# Patient Record
Sex: FEMALE | Race: WHITE | NOT HISPANIC OR LATINO | Employment: UNEMPLOYED | URBAN - METROPOLITAN AREA
[De-identification: names, ages, dates, MRNs, and addresses within clinical notes are randomized per-mention and may not be internally consistent; named-entity substitution may affect disease eponyms.]

---

## 2017-08-28 ENCOUNTER — PROCEDURE VISIT (OUTPATIENT)
Dept: OBSTETRICS AND GYNECOLOGY | Facility: CLINIC | Age: 22
End: 2017-08-28
Payer: MEDICAID

## 2017-08-28 ENCOUNTER — OFFICE VISIT (OUTPATIENT)
Dept: OBSTETRICS AND GYNECOLOGY | Facility: CLINIC | Age: 22
End: 2017-08-28
Payer: MEDICAID

## 2017-08-28 VITALS
WEIGHT: 224.88 LBS | DIASTOLIC BLOOD PRESSURE: 74 MMHG | HEIGHT: 60 IN | BODY MASS INDEX: 44.15 KG/M2 | SYSTOLIC BLOOD PRESSURE: 102 MMHG

## 2017-08-28 DIAGNOSIS — Z34.80 SUPERVISION OF OTHER NORMAL PREGNANCY, ANTEPARTUM: ICD-10-CM

## 2017-08-28 DIAGNOSIS — Z34.80 SUPERVISION OF OTHER NORMAL PREGNANCY, ANTEPARTUM: Primary | ICD-10-CM

## 2017-08-28 LAB
BACTERIA #/AREA URNS AUTO: ABNORMAL /HPF
BILIRUB UR QL STRIP: NEGATIVE
CLARITY UR REFRACT.AUTO: ABNORMAL
COLOR UR AUTO: YELLOW
GLUCOSE UR QL STRIP: NEGATIVE
HGB UR QL STRIP: NEGATIVE
KETONES UR QL STRIP: NEGATIVE
LEUKOCYTE ESTERASE UR QL STRIP: ABNORMAL
MICROSCOPIC COMMENT: ABNORMAL
NITRITE UR QL STRIP: NEGATIVE
NON-SQ EPI CELLS #/AREA URNS AUTO: <1 /HPF
PH UR STRIP: 5 [PH] (ref 5–8)
PROT UR QL STRIP: NEGATIVE
RBC #/AREA URNS AUTO: 1 /HPF (ref 0–4)
SP GR UR STRIP: 1.02 (ref 1–1.03)
SQUAMOUS #/AREA URNS AUTO: 32 /HPF
URN SPEC COLLECT METH UR: ABNORMAL
UROBILINOGEN UR STRIP-ACNC: NEGATIVE EU/DL
WBC #/AREA URNS AUTO: 18 /HPF (ref 0–5)

## 2017-08-28 PROCEDURE — 99213 OFFICE O/P EST LOW 20 MIN: CPT | Mod: TH,25,S$PBB, | Performed by: NURSE PRACTITIONER

## 2017-08-28 PROCEDURE — 3008F BODY MASS INDEX DOCD: CPT | Mod: ,,, | Performed by: NURSE PRACTITIONER

## 2017-08-28 PROCEDURE — 76805 OB US >/= 14 WKS SNGL FETUS: CPT | Mod: PBBFAC | Performed by: OBSTETRICS & GYNECOLOGY

## 2017-08-28 PROCEDURE — 76805 OB US >/= 14 WKS SNGL FETUS: CPT | Mod: 26,S$PBB,, | Performed by: OBSTETRICS & GYNECOLOGY

## 2017-08-28 PROCEDURE — 99999 PR PBB SHADOW E&M-EST. PATIENT-LVL III: CPT | Mod: PBBFAC,,, | Performed by: NURSE PRACTITIONER

## 2017-08-28 RX ORDER — LISDEXAMFETAMINE DIMESYLATE 70 MG/1
CAPSULE ORAL
COMMUNITY
Start: 2017-08-03 | End: 2017-08-28

## 2017-08-28 NOTE — PROGRESS NOTES
CC: Risk assessment    Sybil Bal is a 22 y.o. female  presents for risk assessment.Patient has a h/o Bipolar and is currently on Geodon. Patient has an OB h/o 3 term vaginal deliveries. Different FOB. Last pap exam was normal, .    Past Medical History:   Diagnosis Date    Anemia     Anxiety     Bipolar 1 disorder     Morbid obesity     pt weighed 400# prior to gastric sleeve surgery    Tobacco use disorder      Past Surgical History:   Procedure Laterality Date    ABDOMINAL SURGERY      gastric sleeve      TONSILLECTOMY       Social History     Social History    Marital status: Single     Spouse name: N/A    Number of children: N/A    Years of education: N/A     Occupational History    Not on file.     Social History Main Topics    Smoking status: Heavy Tobacco Smoker     Packs/day: 1.00     Types: Cigarettes     Last attempt to quit: 2/10/2014    Smokeless tobacco: Never Used    Alcohol use No    Drug use: No    Sexual activity: Yes     Partners: Male     Birth control/ protection: None     Other Topics Concern    Not on file     Social History Narrative    No narrative on file     Family History   Problem Relation Age of Onset    Diabetes Neg Hx     Hypertension Neg Hx      OB History      Para Term  AB Living    3 3 3     3    SAB TAB Ectopic Multiple Live Births          0 3          /74   Ht 5' (1.524 m)   Wt 102 kg (224 lb 13.9 oz)   BMI 43.92 kg/m²       ROS:  GENERAL: Denies weight gain or weight loss. Feeling well overall.   SKIN: Denies rash or lesions.   HEAD: Denies head injury or headache.   NODES: Denies enlarged lymph nodes.   CHEST: Denies chest pain or shortness of breath.   CARDIOVASCULAR: Denies palpitations or left sided chest pain.   ABDOMEN: No abdominal pain, constipation, diarrhea, nausea, vomiting or rectal bleeding.   URINARY: No frequency, dysuria, hematuria, or burning on urination.  REPRODUCTIVE: See HPI.   BREASTS: The  patient performs breast self-examination and denies pain, lumps, or nipple discharge.   HEMATOLOGIC: No easy bruisability or excessive bleeding.   MUSCULOSKELETAL: Denies joint pain or swelling.   NEUROLOGIC: Denies syncope or weakness.   PSYCHIATRIC: Denies depression, anxiety or mood swings.    PHYSICAL EXAM:  APPEARANCE: Obese female, in no acute distress.  AFFECT: WNL, alert and oriented x 3  SKIN: No acne or hirsutism  NECK: Neck symmetric without masses or thyromegaly  NODES: No inguinal, cervical, axillary, or femoral lymph node enlargement  CHEST: Good respiratory effect  ABDOMEN: Gravid 20 weeks  PELVIC: Normal external genitalia without lesions.  Normal hair distribution.  Adequate perineal body, normal urethral meatus.  Vagina moist and well rugated without lesions or discharge.  Cervix pink, without lesions, discharge or tenderness.        PLAN:  Ultrasound and labs today  Will remain on Geodon until next visit for additional evaluation from MD or CNM

## 2017-08-29 LAB
BACTERIA UR CULT: NORMAL
BACTERIA UR CULT: NORMAL
C TRACH DNA SPEC QL NAA+PROBE: NOT DETECTED
N GONORRHOEA DNA SPEC QL NAA+PROBE: NOT DETECTED

## 2017-08-30 ENCOUNTER — TELEPHONE (OUTPATIENT)
Dept: OBSTETRICS AND GYNECOLOGY | Facility: CLINIC | Age: 22
End: 2017-08-30

## 2017-08-30 NOTE — TELEPHONE ENCOUNTER
----- Message from Krystle Tellez sent at 8/30/2017  8:24 AM CDT -----  Contact: pt   States she's calling to be worked in later today for her appt. Rescheduling due to not having someone to watch her kids and can be reached at [phone]//[email or web address]

## 2017-09-07 ENCOUNTER — TELEPHONE (OUTPATIENT)
Dept: OBSTETRICS AND GYNECOLOGY | Facility: CLINIC | Age: 22
End: 2017-09-07

## 2017-09-07 NOTE — TELEPHONE ENCOUNTER
Spoke with the patient and she wanted to be scheduled in [place] or [facility] and can not make it to [facility] today. I have the patient rescheduled for Tuesday 9/12/17 @ 1 pm with Maryam.

## 2017-09-07 NOTE — TELEPHONE ENCOUNTER
----- Message from Nemesio Cuevas sent at 9/7/2017  1:49 PM CDT -----  Pt is requesting a call from nurse in regards to r/s her apt.        Please call pt back at [phone]

## 2017-09-12 ENCOUNTER — INITIAL PRENATAL (OUTPATIENT)
Dept: OBSTETRICS AND GYNECOLOGY | Facility: CLINIC | Age: 22
End: 2017-09-12
Payer: MEDICAID

## 2017-09-12 VITALS
SYSTOLIC BLOOD PRESSURE: 100 MMHG | WEIGHT: 224.63 LBS | DIASTOLIC BLOOD PRESSURE: 60 MMHG | BODY MASS INDEX: 43.87 KG/M2

## 2017-09-12 DIAGNOSIS — O99.212 OBESITY AFFECTING PREGNANCY IN SECOND TRIMESTER: ICD-10-CM

## 2017-09-12 PROCEDURE — 99212 OFFICE O/P EST SF 10 MIN: CPT | Mod: PBBFAC,PO | Performed by: ADVANCED PRACTICE MIDWIFE

## 2017-09-12 PROCEDURE — 3008F BODY MASS INDEX DOCD: CPT | Mod: ,,, | Performed by: ADVANCED PRACTICE MIDWIFE

## 2017-09-12 PROCEDURE — 99999 PR PBB SHADOW E&M-EST. PATIENT-LVL II: CPT | Mod: PBBFAC,,, | Performed by: ADVANCED PRACTICE MIDWIFE

## 2017-09-12 PROCEDURE — 99213 OFFICE O/P EST LOW 20 MIN: CPT | Mod: TH,S$PBB,, | Performed by: ADVANCED PRACTICE MIDWIFE

## 2017-09-12 NOTE — PROGRESS NOTES
Pt here for new ob visit  Reoriented to the practice including CNM/MD collaboration  Labs ordered  Introduced to Coffective loree  Blue bag materials reviewed  Warning signs discussed.

## 2017-09-13 ENCOUNTER — LAB VISIT (OUTPATIENT)
Dept: LAB | Facility: HOSPITAL | Age: 22
End: 2017-09-13
Attending: NURSE PRACTITIONER
Payer: MEDICAID

## 2017-09-13 DIAGNOSIS — Z34.80 SUPERVISION OF OTHER NORMAL PREGNANCY, ANTEPARTUM: ICD-10-CM

## 2017-09-13 LAB
ABO + RH BLD: NORMAL
BLD GP AB SCN CELLS X3 SERPL QL: NORMAL
GLUCOSE SERPL-MCNC: 101 MG/DL

## 2017-09-13 PROCEDURE — 86703 HIV-1/HIV-2 1 RESULT ANTBDY: CPT

## 2017-09-13 PROCEDURE — 86901 BLOOD TYPING SEROLOGIC RH(D): CPT

## 2017-09-13 PROCEDURE — 86762 RUBELLA ANTIBODY: CPT

## 2017-09-13 PROCEDURE — 82947 ASSAY GLUCOSE BLOOD QUANT: CPT

## 2017-09-13 PROCEDURE — 80074 ACUTE HEPATITIS PANEL: CPT

## 2017-09-13 PROCEDURE — 36415 COLL VENOUS BLD VENIPUNCTURE: CPT | Mod: PO

## 2017-09-13 PROCEDURE — 86900 BLOOD TYPING SEROLOGIC ABO: CPT

## 2017-09-13 PROCEDURE — 86592 SYPHILIS TEST NON-TREP QUAL: CPT

## 2017-09-14 LAB
HAV IGM SERPL QL IA: NEGATIVE
HBV CORE IGM SERPL QL IA: NEGATIVE
HBV SURFACE AG SERPL QL IA: NEGATIVE
HCV AB SERPL QL IA: NEGATIVE
HIV 1+2 AB+HIV1 P24 AG SERPL QL IA: NEGATIVE
RPR SER QL: NORMAL
RUBV IGG SER-ACNC: 35.6 IU/ML
RUBV IGG SER-IMP: REACTIVE

## 2017-09-25 ENCOUNTER — ROUTINE PRENATAL (OUTPATIENT)
Dept: OBSTETRICS AND GYNECOLOGY | Facility: CLINIC | Age: 22
End: 2017-09-25
Payer: MEDICAID

## 2017-09-25 ENCOUNTER — PROCEDURE VISIT (OUTPATIENT)
Dept: OBSTETRICS AND GYNECOLOGY | Facility: CLINIC | Age: 22
End: 2017-09-25
Payer: MEDICAID

## 2017-09-25 VITALS — SYSTOLIC BLOOD PRESSURE: 108 MMHG | WEIGHT: 221 LBS | BODY MASS INDEX: 43.16 KG/M2 | DIASTOLIC BLOOD PRESSURE: 76 MMHG

## 2017-09-25 DIAGNOSIS — Z34.92 ENCOUNTER FOR SUPERVISION OF NORMAL PREGNANCY IN SECOND TRIMESTER, UNSPECIFIED GRAVIDITY: Primary | ICD-10-CM

## 2017-09-25 PROCEDURE — 3008F BODY MASS INDEX DOCD: CPT | Mod: ,,, | Performed by: ADVANCED PRACTICE MIDWIFE

## 2017-09-25 PROCEDURE — 99212 OFFICE O/P EST SF 10 MIN: CPT | Mod: PBBFAC,PO | Performed by: ADVANCED PRACTICE MIDWIFE

## 2017-09-25 PROCEDURE — 99213 OFFICE O/P EST LOW 20 MIN: CPT | Mod: TH,S$PBB,, | Performed by: ADVANCED PRACTICE MIDWIFE

## 2017-09-25 PROCEDURE — 99999 PR PBB SHADOW E&M-EST. PATIENT-LVL II: CPT | Mod: PBBFAC,,, | Performed by: ADVANCED PRACTICE MIDWIFE

## 2017-09-25 PROCEDURE — 76805 OB US >/= 14 WKS SNGL FETUS: CPT | Mod: 26,S$PBB,, | Performed by: OBSTETRICS & GYNECOLOGY

## 2017-09-25 PROCEDURE — 76805 OB US >/= 14 WKS SNGL FETUS: CPT | Mod: PBBFAC,PO | Performed by: OBSTETRICS & GYNECOLOGY

## 2017-09-25 RX ORDER — LISDEXAMFETAMINE DIMESYLATE 70 MG/1
CAPSULE ORAL
COMMUNITY
Start: 2017-09-14 | End: 2017-11-21

## 2017-09-25 RX ORDER — HYDROXYZINE PAMOATE 25 MG/1
25 CAPSULE ORAL 4 TIMES DAILY
Qty: 30 CAPSULE | Refills: 2 | Status: SHIPPED | OUTPATIENT
Start: 2017-09-25 | End: 2018-01-20 | Stop reason: SDUPTHER

## 2017-09-25 NOTE — PROGRESS NOTES
Anatomy u/s complete  Complaining of a cough. Recommendations made  PTL talk  28 week labs next visit    Coffective counseling sheet Keep Baby Close discussed with mother. Reinforced rooming in practices, continued skin to skin, and quiet hours as requested by mother.  Encouraged mother to download Coffective mobile loree if she has not already done so. Mother verbalizes understanding.

## 2017-11-02 ENCOUNTER — TELEPHONE (OUTPATIENT)
Dept: OBSTETRICS AND GYNECOLOGY | Facility: CLINIC | Age: 22
End: 2017-11-02

## 2017-11-02 NOTE — TELEPHONE ENCOUNTER
----- Message from Sera Lindsey sent at 11/2/2017  2:53 PM CDT -----  Contact: pt  States she's she been having dark blood and contraction. States she's not having contraction at the moment, but when she does physical activity she starts having contraction. States she's 28 weeks pregnant. Please call pt at [phone]. Thank you

## 2017-11-19 ENCOUNTER — HOSPITAL ENCOUNTER (OUTPATIENT)
Facility: HOSPITAL | Age: 22
Discharge: HOME | End: 2017-11-19
Attending: OBSTETRICS & GYNECOLOGY | Admitting: OBSTETRICS & GYNECOLOGY
Payer: MEDICAID

## 2017-11-19 VITALS
DIASTOLIC BLOOD PRESSURE: 46 MMHG | TEMPERATURE: 98 F | WEIGHT: 236 LBS | HEIGHT: 60 IN | RESPIRATION RATE: 18 BRPM | SYSTOLIC BLOOD PRESSURE: 109 MMHG | HEART RATE: 89 BPM | BODY MASS INDEX: 46.33 KG/M2

## 2017-11-19 DIAGNOSIS — N93.9 VAGINAL SPOTTING: ICD-10-CM

## 2017-11-19 LAB
BACTERIA #/AREA URNS HPF: ABNORMAL /HPF
BILIRUB UR QL STRIP: NEGATIVE
CLARITY UR: ABNORMAL
COLOR UR: YELLOW
GLUCOSE UR QL STRIP: NEGATIVE
HGB UR QL STRIP: NEGATIVE
KETONES UR QL STRIP: ABNORMAL
LEUKOCYTE ESTERASE UR QL STRIP: ABNORMAL
MICROSCOPIC COMMENT: ABNORMAL
NITRITE UR QL STRIP: NEGATIVE
PH UR STRIP: 6 [PH] (ref 5–8)
PROT UR QL STRIP: NEGATIVE
SP GR UR STRIP: 1.02 (ref 1–1.03)
SQUAMOUS #/AREA URNS HPF: 40 /HPF
URN SPEC COLLECT METH UR: ABNORMAL
UROBILINOGEN UR STRIP-ACNC: NEGATIVE EU/DL
WBC #/AREA URNS HPF: 20 /HPF (ref 0–5)
WBC CLUMPS URNS QL MICRO: ABNORMAL

## 2017-11-19 PROCEDURE — 87086 URINE CULTURE/COLONY COUNT: CPT

## 2017-11-19 PROCEDURE — 81000 URINALYSIS NONAUTO W/SCOPE: CPT

## 2017-11-19 PROCEDURE — 87591 N.GONORRHOEAE DNA AMP PROB: CPT

## 2017-11-19 PROCEDURE — 99211 OFF/OP EST MAY X REQ PHY/QHP: CPT

## 2017-11-19 PROCEDURE — 99213 OFFICE O/P EST LOW 20 MIN: CPT | Mod: ,,, | Performed by: ADVANCED PRACTICE MIDWIFE

## 2017-11-19 RX ORDER — ONDANSETRON 8 MG/1
8 TABLET, ORALLY DISINTEGRATING ORAL EVERY 8 HOURS PRN
Status: DISCONTINUED | OUTPATIENT
Start: 2017-11-19 | End: 2017-11-20 | Stop reason: HOSPADM

## 2017-11-19 RX ORDER — ACETAMINOPHEN 500 MG
500 TABLET ORAL EVERY 6 HOURS PRN
Status: DISCONTINUED | OUTPATIENT
Start: 2017-11-19 | End: 2017-11-20 | Stop reason: HOSPADM

## 2017-11-20 LAB
C TRACH DNA SPEC QL NAA+PROBE: NOT DETECTED
N GONORRHOEA DNA SPEC QL NAA+PROBE: NOT DETECTED

## 2017-11-20 NOTE — SUBJECTIVE & OBJECTIVE
Obstetric HPI:      This pregnancy has been complicated by tobacco use, HSV, obesity    Obstetric History       T3      L3     SAB0   TAB0   Ectopic0   Multiple0   Live Births3       # Outcome Date GA Lbr Rene/2nd Weight Sex Delivery Anes PTL Lv   4 Current            3 Term 16 37w6d / 00:03 3.195 kg (7 lb 0.7 oz) M Vag-Spont EPI N JAVI      Apgar1:  8                Apgar5: 8   2 Term 10/03/14   3.09 kg (6 lb 13 oz)  Vag-Spont   JAVI   1 Term 12   3.544 kg (7 lb 13 oz) F Vag-Spont   JAVI        Past Medical History:   Diagnosis Date    Anemia     Anxiety     Bipolar 1 disorder     Morbid obesity     pt weighed 400# prior to gastric sleeve surgery    Tobacco use disorder      Past Surgical History:   Procedure Laterality Date    ABDOMINAL SURGERY      gastric sleeve      TONSILLECTOMY         PTA Medications   Medication Sig    hydrOXYzine pamoate (VISTARIL) 25 MG Cap Take 1 capsule (25 mg total) by mouth 4 (four) times daily.    ziprasidone (GEODON) 80 MG capsule Take 80 mg by mouth once daily.    VYVANSE 70 mg capsule        Review of patient's allergies indicates:  No Known Allergies     Family History     None        Social History Main Topics    Smoking status: Heavy Tobacco Smoker     Packs/day: 1.00     Types: Cigarettes     Last attempt to quit: 2/10/2014    Smokeless tobacco: Never Used    Alcohol use No    Drug use: No    Sexual activity: Yes     Partners: Male     Birth control/ protection: None     Review of Systems   Gastrointestinal: Positive for abdominal pain.   Genitourinary: Positive for vaginal bleeding.        Leakage of fluid   All other systems reviewed and are negative.     Objective:     Vital Signs (Most Recent):    Vital Signs (24h Range):        Weight: 107 kg (236 lb)  Body mass index is 46.09 kg/m².    FHT: Cat 1 (reassuring)  TOCO:  no UC    Physical Exam:   Constitutional: She is oriented to person, place, and time. She appears well-developed and  well-nourished.      Neck: Normal range of motion.          Abdominal: Soft.     Genitourinary: Vaginal discharge (yellow/white d/c noted, wet prep done many WBCs noted, no fluid, no bleeding, neg nitrazine, gc/chl obtained) found.           Musculoskeletal: Normal range of motion.       Neurological: She is alert and oriented to person, place, and time. She has normal reflexes.    Skin: Skin is warm and dry.    Psychiatric: She has a normal mood and affect. Her behavior is normal.       Cervix: closed per speculum exam       Significant Labs:  Lab Results   Component Value Date    GROUPTRH O POS 09/13/2017    HEPBSAG Negative 09/13/2017    STREPBCULT No Group B Streptococcus isolated 03/24/2016       UA pending

## 2017-11-20 NOTE — DISCHARGE SUMMARY
[facility]  Obstetrics  Discharge Summary      Patient Name: Sybil Bal  MRN: [id number]  Admission Date: 2017  Hospital Length of Stay: 0 days  Discharge Date and Time:  2017 9:11 PM  Attending Physician: Rianna Gama MD   Discharging Provider: Gabbi Aguirre CNM  Primary Care Provider: Clary Monroe MD    HPI: 23yo  @ 31w5d JEET 18 arrives with c/o vaginal spotting and leaking of fluid    * No surgery found *     Hospital Course:   EFM and observation  UA contaminated, culture ordered, no UC, will d/c home        Final Active Diagnoses:    Diagnosis Date Noted POA      Problems Resolved During this Admission:    Diagnosis Date Noted Date Resolved POA    PRINCIPAL PROBLEM:  Vaginal spotting [N92.0] 2017 Yes        Labs: All labs within the past 24 hours have been reviewed    Feeding Method: NA    Immunizations     None          This patient has no babies on file.  Pending Diagnostic Studies:     Procedure Component Value Units Date/Time    Urinalysis [[id number]] Collected:  17    Order Status:  Sent Lab Status:  In process Updated:  17    Specimen:  Urine           Discharged Condition: good    Disposition: Home or Self Care    Follow Up:  Follow-up Information     Please follow up.    Why:  As needed               Patient Instructions:     Diet general       Medications:  Current Discharge Medication List      CONTINUE these medications which have NOT CHANGED    Details   hydrOXYzine pamoate (VISTARIL) 25 MG Cap Take 1 capsule (25 mg total) by mouth 4 (four) times daily.  Qty: 30 capsule, Refills: 2      ziprasidone (GEODON) 80 MG capsule Take 80 mg by mouth once daily.      VYVANSE 70 mg capsule              Gabbi Aguirre CNM  Obstetrics  [facility]

## 2017-11-20 NOTE — H&P
[facility]   Obstetrics  History & Physical    Patient Name: Sybil Bal  MRN: [id number]  Admission Date: 2017  Primary Care Provider: Clary Monroe MD    Subjective:     Principal Problem:Vaginal spotting    History of Present Illness:  23yo  @ 31w5d JEET 18 arrives with c/o vaginal spotting and leaking of fluid    Obstetric HPI:      This pregnancy has been complicated by tobacco use, HSV, obesity    Obstetric History       T3      L3     SAB0   TAB0   Ectopic0   Multiple0   Live Births3       # Outcome Date GA Lbr Rene/2nd Weight Sex Delivery Anes PTL Lv   4 Current            3 Term 16 37w6d / 00:03 3.195 kg (7 lb 0.7 oz) M Vag-Spont EPI N JAVI      Apgar1:  8                Apgar5: 8   2 Term 10/03/14   3.09 kg (6 lb 13 oz)  Vag-Spont   JAVI   1 Term 12   3.544 kg (7 lb 13 oz) F Vag-Spont   JAVI        Past Medical History:   Diagnosis Date    Anemia     Anxiety     Bipolar 1 disorder     Morbid obesity     pt weighed 400# prior to gastric sleeve surgery    Tobacco use disorder      Past Surgical History:   Procedure Laterality Date    ABDOMINAL SURGERY      gastric sleeve      TONSILLECTOMY         PTA Medications   Medication Sig    hydrOXYzine pamoate (VISTARIL) 25 MG Cap Take 1 capsule (25 mg total) by mouth 4 (four) times daily.    ziprasidone (GEODON) 80 MG capsule Take 80 mg by mouth once daily.    VYVANSE 70 mg capsule        Review of patient's allergies indicates:  No Known Allergies     Family History     None        Social History Main Topics    Smoking status: Heavy Tobacco Smoker     Packs/day: 1.00     Types: Cigarettes     Last attempt to quit: 2/10/2014    Smokeless tobacco: Never Used    Alcohol use No    Drug use: No    Sexual activity: Yes     Partners: Male     Birth control/ protection: None     Review of Systems   Gastrointestinal: Positive for abdominal pain.   Genitourinary: Positive for vaginal bleeding.         Leakage of fluid   All other systems reviewed and are negative.     Objective:     Vital Signs (Most Recent):    Vital Signs (24h Range):        Weight: 107 kg (236 lb)  Body mass index is 46.09 kg/m².    FHT: Cat 1 (reassuring)  TOCO:  no UC    Physical Exam:   Constitutional: She is oriented to person, place, and time. She appears well-developed and well-nourished.      Neck: Normal range of motion.          Abdominal: Soft.     Genitourinary: Vaginal discharge (yellow/white d/c noted, wet prep done many WBCs noted, no fluid, no bleeding, neg nitrazine, gc/chl obtained) found.           Musculoskeletal: Normal range of motion.       Neurological: She is alert and oriented to person, place, and time. She has normal reflexes.    Skin: Skin is warm and dry.    Psychiatric: She has a normal mood and affect. Her behavior is normal.       Cervix: closed per speculum exam       Significant Labs:  Lab Results   Component Value Date    GROUPTRH O POS 2017    HEPBSAG Negative 2017    STREPBCULT No Group B Streptococcus isolated 2016       UA pending    Assessment/Plan:     22 y.o. female  at 31w5d for:    * Vaginal spotting    Speculum exam done, cultures obtained, urine sent, will observe on AIDEE Spaulding CNM  Obstetrics  [facility]

## 2017-11-21 ENCOUNTER — ROUTINE PRENATAL (OUTPATIENT)
Dept: OBSTETRICS AND GYNECOLOGY | Facility: CLINIC | Age: 22
End: 2017-11-21
Payer: MEDICAID

## 2017-11-21 VITALS
BODY MASS INDEX: 46.61 KG/M2 | SYSTOLIC BLOOD PRESSURE: 110 MMHG | DIASTOLIC BLOOD PRESSURE: 70 MMHG | WEIGHT: 238.63 LBS

## 2017-11-21 DIAGNOSIS — O99.213 OBESITY AFFECTING PREGNANCY IN THIRD TRIMESTER: Primary | ICD-10-CM

## 2017-11-21 DIAGNOSIS — Z34.83 ENCOUNTER FOR SUPERVISION OF OTHER NORMAL PREGNANCY IN THIRD TRIMESTER: ICD-10-CM

## 2017-11-21 LAB
BACTERIA UR CULT: NORMAL
BACTERIA UR CULT: NORMAL

## 2017-11-21 PROCEDURE — 99213 OFFICE O/P EST LOW 20 MIN: CPT | Mod: TH,S$PBB,, | Performed by: ADVANCED PRACTICE MIDWIFE

## 2017-11-21 PROCEDURE — 99212 OFFICE O/P EST SF 10 MIN: CPT | Mod: PBBFAC,PO | Performed by: ADVANCED PRACTICE MIDWIFE

## 2017-11-21 PROCEDURE — 99999 PR PBB SHADOW E&M-EST. PATIENT-LVL II: CPT | Mod: PBBFAC,,, | Performed by: ADVANCED PRACTICE MIDWIFE

## 2017-11-21 NOTE — PROGRESS NOTES
Lapse in care. Pt states she missed her appt and then had trouble scheduling again  PTL talk  Will do 28 week labs Friday  U/s next visit bpp/growth BMI    Coffective counseling sheet Nourish discussed with mother. Reinforced basic breastfeeding position and latch as well as proper hand expression technique. Encouraged mother to download Coffective mobile loree if she has not already done so.  Mother verbalizes understanding.

## 2017-11-28 ENCOUNTER — TELEPHONE (OUTPATIENT)
Dept: OBSTETRICS AND GYNECOLOGY | Facility: CLINIC | Age: 22
End: 2017-11-28

## 2017-11-28 NOTE — TELEPHONE ENCOUNTER
I will not prescibe it, however it is a cat C medication and she has been on it so it would not be advised to stop it at this time

## 2017-11-28 NOTE — TELEPHONE ENCOUNTER
----- Message from Gabbi Marie sent at 11/28/2017  1:56 PM CST -----  Contact: tu resendez/mother [phone]  States that she would like to speak to nurse regarding pt. States that pt PCP is not wanting to prescribe pt geodon while pt is pregnant. States that she is calling to see if ob/gyn will be able to prescribe geodon or let PCP know that it is okay for pt to take the geodon. Please call back at [phone]//thank you acc

## 2017-11-30 ENCOUNTER — TELEPHONE (OUTPATIENT)
Dept: OBSTETRICS AND GYNECOLOGY | Facility: CLINIC | Age: 22
End: 2017-11-30

## 2017-11-30 ENCOUNTER — HOSPITAL ENCOUNTER (OUTPATIENT)
Facility: HOSPITAL | Age: 22
Discharge: HOME | End: 2017-11-30
Attending: OBSTETRICS & GYNECOLOGY | Admitting: OBSTETRICS & GYNECOLOGY
Payer: MEDICAID

## 2017-11-30 VITALS
SYSTOLIC BLOOD PRESSURE: 125 MMHG | BODY MASS INDEX: 47.71 KG/M2 | RESPIRATION RATE: 18 BRPM | DIASTOLIC BLOOD PRESSURE: 71 MMHG | HEART RATE: 80 BPM | TEMPERATURE: 98 F | HEIGHT: 60 IN | WEIGHT: 243 LBS

## 2017-11-30 DIAGNOSIS — R10.9 ABDOMINAL PAIN: ICD-10-CM

## 2017-11-30 PROBLEM — Z30.2 REQUEST FOR STERILIZATION: Status: ACTIVE | Noted: 2017-11-30

## 2017-11-30 LAB
BASOPHILS # BLD AUTO: 0.03 K/UL
BASOPHILS NFR BLD: 0.2 %
BILIRUB UR QL STRIP: NEGATIVE
CLARITY UR: CLEAR
COLOR UR: YELLOW
DIFFERENTIAL METHOD: ABNORMAL
EOSINOPHIL # BLD AUTO: 0.6 K/UL
EOSINOPHIL NFR BLD: 4 %
ERYTHROCYTE [DISTWIDTH] IN BLOOD BY AUTOMATED COUNT: 13.3 %
GLUCOSE UR QL STRIP: ABNORMAL
HCT VFR BLD AUTO: 28.7 %
HGB BLD-MCNC: 9.4 G/DL
HGB UR QL STRIP: NEGATIVE
KETONES UR QL STRIP: ABNORMAL
LEUKOCYTE ESTERASE UR QL STRIP: ABNORMAL
LYMPHOCYTES # BLD AUTO: 2 K/UL
LYMPHOCYTES NFR BLD: 13.7 %
MCH RBC QN AUTO: 27.5 PG
MCHC RBC AUTO-ENTMCNC: 32.8 G/DL
MCV RBC AUTO: 84 FL
MICROSCOPIC COMMENT: NORMAL
MONOCYTES # BLD AUTO: 0.8 K/UL
MONOCYTES NFR BLD: 5.7 %
NEUTROPHILS # BLD AUTO: 11.3 K/UL
NEUTROPHILS NFR BLD: 76.4 %
NITRITE UR QL STRIP: NEGATIVE
PH UR STRIP: 7 [PH] (ref 5–8)
PLATELET # BLD AUTO: 337 K/UL
PMV BLD AUTO: 9.7 FL
PROT UR QL STRIP: ABNORMAL
RBC # BLD AUTO: 3.42 M/UL
SP GR UR STRIP: 1.02 (ref 1–1.03)
URN SPEC COLLECT METH UR: ABNORMAL
UROBILINOGEN UR STRIP-ACNC: ABNORMAL EU/DL
WBC # BLD AUTO: 14.79 K/UL
WBC #/AREA URNS HPF: 4 /HPF (ref 0–5)

## 2017-11-30 PROCEDURE — 99211 OFF/OP EST MAY X REQ PHY/QHP: CPT | Mod: 25

## 2017-11-30 PROCEDURE — 25000003 PHARM REV CODE 250: Performed by: OBSTETRICS & GYNECOLOGY

## 2017-11-30 PROCEDURE — 59025 FETAL NON-STRESS TEST: CPT

## 2017-11-30 PROCEDURE — 81000 URINALYSIS NONAUTO W/SCOPE: CPT

## 2017-11-30 PROCEDURE — 99218 PR INITIAL OBSERVATION CARE,LEVL I: CPT | Mod: ,,, | Performed by: OBSTETRICS & GYNECOLOGY

## 2017-11-30 PROCEDURE — 83036 HEMOGLOBIN GLYCOSYLATED A1C: CPT

## 2017-11-30 PROCEDURE — 85025 COMPLETE CBC W/AUTO DIFF WBC: CPT

## 2017-11-30 RX ORDER — ACETAMINOPHEN 500 MG
500 TABLET ORAL EVERY 6 HOURS PRN
Status: DISCONTINUED | OUTPATIENT
Start: 2017-11-30 | End: 2017-12-01 | Stop reason: HOSPADM

## 2017-11-30 RX ORDER — CYCLOBENZAPRINE HCL 5 MG
5 TABLET ORAL 3 TIMES DAILY PRN
Qty: 15 TABLET | Refills: 0 | Status: SHIPPED | OUTPATIENT
Start: 2017-12-01 | End: 2017-12-05

## 2017-11-30 RX ORDER — CYCLOBENZAPRINE HCL 5 MG
5 TABLET ORAL 3 TIMES DAILY PRN
Status: DISCONTINUED | OUTPATIENT
Start: 2017-12-01 | End: 2017-12-01 | Stop reason: HOSPADM

## 2017-11-30 RX ORDER — ONDANSETRON 4 MG/1
4 TABLET, ORALLY DISINTEGRATING ORAL ONCE
Status: DISCONTINUED | OUTPATIENT
Start: 2017-11-30 | End: 2017-12-01 | Stop reason: HOSPADM

## 2017-11-30 RX ORDER — ONDANSETRON 8 MG/1
8 TABLET, ORALLY DISINTEGRATING ORAL EVERY 8 HOURS PRN
Status: DISCONTINUED | OUTPATIENT
Start: 2017-11-30 | End: 2017-12-01 | Stop reason: HOSPADM

## 2017-11-30 RX ORDER — CYCLOBENZAPRINE HCL 10 MG
10 TABLET ORAL ONCE
Status: COMPLETED | OUTPATIENT
Start: 2017-11-30 | End: 2017-11-30

## 2017-11-30 RX ORDER — VALACYCLOVIR HYDROCHLORIDE 500 MG/1
500 TABLET, FILM COATED ORAL DAILY
Status: DISCONTINUED | OUTPATIENT
Start: 2017-12-01 | End: 2017-12-01 | Stop reason: HOSPADM

## 2017-11-30 RX ADMIN — CYCLOBENZAPRINE HYDROCHLORIDE 10 MG: 10 TABLET, FILM COATED ORAL at 07:11

## 2017-11-30 NOTE — TELEPHONE ENCOUNTER
"Pt c/o pain "in my belly button that shoots down my left side," some relief with lying down.  Per Will, pt was advised to increase fluids, lie down, if no relief after 2 hours, or worsens, go to L&D at [facility] for evaluation.  Pt voiced understanding.  VB, LPN  "

## 2017-11-30 NOTE — TELEPHONE ENCOUNTER
----- Message from Dani Moncada sent at 11/30/2017 10:50 AM CST -----  Contact: pt mother Erica  Pt is calling nurse staff regarding a letter stating that pt should continue taking the Geodon medication. Please call phone Dr Brenda Cueto [phone] and the Fax [phone] . Pt call back [phone] to be advised. thanks

## 2017-12-01 LAB
ESTIMATED AVG GLUCOSE: 100 MG/DL
HBA1C MFR BLD HPLC: 5.1 %

## 2017-12-01 NOTE — SUBJECTIVE & OBJECTIVE
"Obstetric HPI:  Patient reports None contractions, active fetal movement, No vaginal bleeding , No loss of fluid. Patient reports left sided, "stabbing pain," that radiates from umbilicus to left side of abdomen to back, states pain began ~6 hours ago.    This pregnancy has been complicated by obesity, HSV 2, smoker, anemia, bipolar disorder, and inadequate prenatal care.     Obstetric History       T3      L3     SAB0   TAB0   Ectopic0   Multiple0   Live Births3       # Outcome Date GA Lbr Rene/2nd Weight Sex Delivery Anes PTL Lv   4 Current            3 Term 16 37w6d / 00:03 3.195 kg (7 lb 0.7 oz) M Vag-Spont EPI N JAVI      Apgar1:  8                Apgar5: 8   2 Term 10/03/14   3.09 kg (6 lb 13 oz)  Vag-Spont   JAVI   1 Term 12   3.544 kg (7 lb 13 oz) F Vag-Spont   JAVI        Past Medical History:   Diagnosis Date    Anemia     Anxiety     Bipolar 1 disorder     Morbid obesity     pt weighed 400# prior to gastric sleeve surgery    Tobacco use disorder      Past Surgical History:   Procedure Laterality Date    ABDOMINAL SURGERY      gastric sleeve      TONSILLECTOMY         PTA Medications   Medication Sig    hydrOXYzine pamoate (VISTARIL) 25 MG Cap Take 1 capsule (25 mg total) by mouth 4 (four) times daily.    ziprasidone (GEODON) 80 MG capsule Take 80 mg by mouth once daily.       Review of patient's allergies indicates:  No Known Allergies     Family History     None        Social History Main Topics    Smoking status: Heavy Tobacco Smoker     Packs/day: 1.00     Types: Cigarettes     Last attempt to quit: 2/10/2014    Smokeless tobacco: Never Used    Alcohol use No    Drug use: No    Sexual activity: Yes     Partners: Male     Birth control/ protection: None     Review of Systems   Constitutional: Negative.    HENT: Negative.    Eyes: Negative.    Respiratory: Positive for cough.    Cardiovascular: Negative.    Gastrointestinal: Negative.    Endocrine: Negative.  "   Genitourinary: Negative.    Musculoskeletal: Negative.    Skin:  Negative.   Neurological: Negative.    Hematological: Negative.    Psychiatric/Behavioral: Negative.    Breast: negative.       Objective:     Vital Signs (Most Recent):    Vital Signs (24h Range):           There is no height or weight on file to calculate BMI.    FHT: Cat 1 (reassuring) , accels present, no decels  TOCO: acontractile    Physical Exam:   Constitutional: She is oriented to person, place, and time. She appears well-developed and well-nourished.    HENT:   Head: Normocephalic and atraumatic.    Eyes: Conjunctivae and EOM are normal. Pupils are equal, round, and reactive to light.    Neck: Normal range of motion.    Cardiovascular: Normal rate, regular rhythm, normal heart sounds and intact distal pulses.  Exam reveals no edema.     Pulmonary/Chest: Effort normal and breath sounds normal.   Cough present, breast sounds clear bilaterally        Abdominal: Soft. Bowel sounds are normal. There is tenderness.   Patient reports tenderness to left lower abdomen when palpated     Genitourinary: Vagina normal and uterus normal.   Genitourinary Comments: No CVA tenderness present           Musculoskeletal: Normal range of motion and moves all extremeties.       Neurological: She is alert and oriented to person, place, and time.    Skin: Skin is warm and dry.    Psychiatric:   Appears in distress,  Unable to determine if thought content is normal       Cervix: deferred       Significant Labs:  Lab Results   Component Value Date    GROUPTRH O POS 09/13/2017    HEPBSAG Negative 09/13/2017    STREPBCULT No Group B Streptococcus isolated 03/24/2016       I have personallly reviewed all pertinent lab results from the last 24 hours.

## 2017-12-01 NOTE — H&P
"[facility] -   Obstetrics  History & Physical    Patient Name: Sybil Bal  MRN: [id number]  Admission Date: 2017  Primary Care Provider: Clary Monroe MD    Subjective:     Principal Problem: left sided abdominal pain  History of Present Illness:  , 33w2d, presented to L&D for constant left lower abdominal pain    Obstetric HPI:  Patient reports None contractions, active fetal movement, No vaginal bleeding , No loss of fluid. Patient reports left sided, "stabbing pain," that radiates from umbilicus to left side of abdomen to back, states pain began ~6 hours ago.    This pregnancy has been complicated by obesity, HSV 2, smoker, anemia, bipolar disorder, and inadequate prenatal care.     Obstetric History       T3      L3     SAB0   TAB0   Ectopic0   Multiple0   Live Births3       # Outcome Date GA Lbr Rene/2nd Weight Sex Delivery Anes PTL Lv   4 Current            3 Term 16 37w6d / 00:03 3.195 kg (7 lb 0.7 oz) M Vag-Spont EPI N JAVI      Apgar1:  8                Apgar5: 8   2 Term 10/03/14   3.09 kg (6 lb 13 oz)  Vag-Spont   JAVI   1 Term 12   3.544 kg (7 lb 13 oz) F Vag-Spont   JAVI        Past Medical History:   Diagnosis Date    Anemia     Anxiety     Bipolar 1 disorder     Morbid obesity     pt weighed 400# prior to gastric sleeve surgery    Tobacco use disorder      Past Surgical History:   Procedure Laterality Date    ABDOMINAL SURGERY      gastric sleeve      TONSILLECTOMY         PTA Medications   Medication Sig    hydrOXYzine pamoate (VISTARIL) 25 MG Cap Take 1 capsule (25 mg total) by mouth 4 (four) times daily.    ziprasidone (GEODON) 80 MG capsule Take 80 mg by mouth once daily.       Review of patient's allergies indicates:  No Known Allergies     Family History     None        Social History Main Topics    Smoking status: Heavy Tobacco Smoker     Packs/day: 1.00     Types: Cigarettes     Last attempt to quit: 2/10/2014    Smokeless " tobacco: Never Used    Alcohol use No    Drug use: No    Sexual activity: Yes     Partners: Male     Birth control/ protection: None     Review of Systems   Constitutional: Negative.    HENT: Negative.    Eyes: Negative.    Respiratory: Positive for cough.    Cardiovascular: Negative.    Gastrointestinal: Negative.    Endocrine: Negative.    Genitourinary: Negative.    Musculoskeletal: Negative.    Skin:  Negative.   Neurological: Negative.    Hematological: Negative.    Psychiatric/Behavioral: Negative.    Breast: negative.       Objective:     Vital Signs (Most Recent):    Vital Signs (24h Range):           There is no height or weight on file to calculate BMI.    FHT: Cat 1 (reassuring) , accels present, no decels  TOCO: acontractile    Physical Exam:   Constitutional: She is oriented to person, place, and time. She appears well-developed and well-nourished.    HENT:   Head: Normocephalic and atraumatic.    Eyes: Conjunctivae and EOM are normal. Pupils are equal, round, and reactive to light.    Neck: Normal range of motion.    Cardiovascular: Normal rate, regular rhythm, normal heart sounds and intact distal pulses.  Exam reveals no edema.     Pulmonary/Chest: Effort normal and breath sounds normal.   Cough present, breast sounds clear bilaterally        Abdominal: Soft. Bowel sounds are normal. There is tenderness.   Patient reports tenderness to left lower abdomen when palpated     Genitourinary: Vagina normal and uterus normal.   Genitourinary Comments: No CVA tenderness present           Musculoskeletal: Normal range of motion and moves all extremeties.       Neurological: She is alert and oriented to person, place, and time.    Skin: Skin is warm and dry.    Psychiatric:   Appears in distress,  Unable to determine if thought content is normal       Cervix: deferred       Significant Labs:  Lab Results   Component Value Date    GROUPTRH O POS 09/13/2017    HEPBSAG Negative 09/13/2017    STREPBCULT  No Group B Streptococcus isolated 2016       I have personallly reviewed all pertinent lab results from the last 24 hours.    Assessment/Plan:     22 y.o. female  at 33w2d for:    No notes have been filed under this hospital service.  Service: Obstetrics and Gynecology    1) Urinalysis now. CBC and A1C ordered due to patient not completing routine 28 week labs.  2) Discussed normal discomforts of pregnancy with patient, such as ligament pain with growth of baby and uterus.  3) Plan of care depending on results of UA.  4) Continue to monitor EFM/TOCO.    A. Branch AIDEE Newman CNM  Obstetrics  [facility]

## 2017-12-01 NOTE — DISCHARGE SUMMARY
[facility] -   Obstetrics  Discharge Summary      Patient Name: Sybil Bal  MRN: [id number]  Admission Date: 2017  Hospital Length of Stay: 0 days  Discharge Date and Time:  2017 10:11 PM  Attending Physician: Payal Quiroz MD   Discharging Provider: Sonia Newman CNM  Primary Care Provider: Clary Monroe MD    HPI: , 33w2d, presented to L&D for constant left lower abdominal pain    * No surgery found *     Hospital Course:   2017 @ 1800-patient on continuous EFM/TOCO, UA, A1C and CBC  Urine done and noted with no evidence of infection. S.G. 1.020, ketones 1+.   Po hydration done. Flexaril 10 mg po for MSK/ back spasm with relief obtained.   NST reassuring.  Cervix closed. No regular ctx noted.  Supplemental labs done as not on chart for 28 wks.         Final Active Diagnoses:    Diagnosis Date Noted POA    PRINCIPAL PROBLEM:  Abdominal pain [R10.9] 2017 Yes    Request for sterilization- Consented.  [Z78.9] 2017 Yes      Problems Resolved During this Admission:    Diagnosis Date Noted Date Resolved POA        Labs: All labs within the past 24 hours have been reviewed    Feeding Method: undelivered.     Immunizations     None          This patient has no babies on file.  Pending Diagnostic Studies:     Procedure Component Value Units Date/Time    Hemoglobin A1c [[id number]] Collected:  17    Order Status:  Sent Lab Status:  In process Updated:  17    Specimen:  Blood from Blood           Discharged Condition: stable    Disposition:     Follow Up:  Follow-up Information     Please follow up.    Why:  keep appt as scheduled on Tuesday               Patient Instructions:   No discharge procedures on file.  Medications:  Current Discharge Medication List      START taking these medications    Details   cyclobenzaprine (FLEXERIL) 5 MG tablet Take 1 tablet (5 mg total) by mouth 3 (three) times daily as needed for Muscle spasms.  Qty: 15  tablet, Refills: 0         CONTINUE these medications which have NOT CHANGED    Details   hydrOXYzine pamoate (VISTARIL) 25 MG Cap Take 1 capsule (25 mg total) by mouth 4 (four) times daily.  Qty: 30 capsule, Refills: 2      ziprasidone (GEODON) 80 MG capsule Take 80 mg by mouth once daily.             Sonia Newman CNM  Obstetrics  [facility]

## 2017-12-01 NOTE — ASSESSMENT & PLAN NOTE
Warm tub soaks as felt MSK in origin.  RX for Flexaril 5 mg PO TID prn for next 3-5 days.  Monitor for s/s PTL.

## 2017-12-01 NOTE — PROCEDURES
NST    11/30/2017  33w2d  Indication for Test:  Abdominal pain     Fetal Heart Rate (PeriCALM)  Fetal Monitor Mode 1: Ultrasound  Baseline FHR 1: 115  Variability 1: Moderate (6-25)  Accels 1: Yes  Decels 1: None

## 2017-12-05 ENCOUNTER — ROUTINE PRENATAL (OUTPATIENT)
Dept: OBSTETRICS AND GYNECOLOGY | Facility: CLINIC | Age: 22
End: 2017-12-05
Payer: MEDICAID

## 2017-12-05 ENCOUNTER — PROCEDURE VISIT (OUTPATIENT)
Dept: OBSTETRICS AND GYNECOLOGY | Facility: CLINIC | Age: 22
End: 2017-12-05
Payer: MEDICAID

## 2017-12-05 VITALS
DIASTOLIC BLOOD PRESSURE: 70 MMHG | WEIGHT: 239.88 LBS | SYSTOLIC BLOOD PRESSURE: 110 MMHG | BODY MASS INDEX: 46.84 KG/M2

## 2017-12-05 DIAGNOSIS — Z34.83 ENCOUNTER FOR SUPERVISION OF OTHER NORMAL PREGNANCY IN THIRD TRIMESTER: Primary | ICD-10-CM

## 2017-12-05 DIAGNOSIS — O99.213 OBESITY AFFECTING PREGNANCY IN THIRD TRIMESTER: ICD-10-CM

## 2017-12-05 PROCEDURE — 99213 OFFICE O/P EST LOW 20 MIN: CPT | Mod: TH,S$PBB,, | Performed by: ADVANCED PRACTICE MIDWIFE

## 2017-12-05 PROCEDURE — 76819 FETAL BIOPHYS PROFIL W/O NST: CPT | Mod: 26,59,S$PBB, | Performed by: OBSTETRICS & GYNECOLOGY

## 2017-12-05 PROCEDURE — 76819 FETAL BIOPHYS PROFIL W/O NST: CPT | Mod: 59,PBBFAC,PO | Performed by: OBSTETRICS & GYNECOLOGY

## 2017-12-05 PROCEDURE — 99999 PR PBB SHADOW E&M-EST. PATIENT-LVL III: CPT | Mod: PBBFAC,,, | Performed by: ADVANCED PRACTICE MIDWIFE

## 2017-12-05 PROCEDURE — 76815 OB US LIMITED FETUS(S): CPT | Mod: 26,S$PBB,, | Performed by: OBSTETRICS & GYNECOLOGY

## 2017-12-05 PROCEDURE — 99213 OFFICE O/P EST LOW 20 MIN: CPT | Mod: PBBFAC,PO | Performed by: ADVANCED PRACTICE MIDWIFE

## 2017-12-05 PROCEDURE — 76815 OB US LIMITED FETUS(S): CPT | Mod: 59,PBBFAC,PO | Performed by: OBSTETRICS & GYNECOLOGY

## 2017-12-05 RX ORDER — AZITHROMYCIN 250 MG/1
TABLET, FILM COATED ORAL
Qty: 6 TABLET | Refills: 0 | Status: SHIPPED | OUTPATIENT
Start: 2017-12-05 | End: 2017-12-10

## 2017-12-05 NOTE — PROGRESS NOTES
BPP 8/8  Pt complaining of sinus congestion and cough ongoing for almost 2 weeks. + maxillary tenderness, states coughing up green, thick sputum.  Rx z pack called to pharmacy  PTL talk    Coffective counseling sheet Protect Breastfeeding discussed with mother. Reinforced avoidence of artifical nipples and formula, unless medically indicated.  Encouraged mother to download Coffective mobile loree if she has not already done so. Mother verbalizes understanding.

## 2017-12-11 ENCOUNTER — HOSPITAL ENCOUNTER (OUTPATIENT)
Facility: HOSPITAL | Age: 22
Discharge: HOME | End: 2017-12-12
Attending: OBSTETRICS & GYNECOLOGY | Admitting: OBSTETRICS & GYNECOLOGY
Payer: MEDICAID

## 2017-12-11 ENCOUNTER — TELEPHONE (OUTPATIENT)
Dept: OBSTETRICS AND GYNECOLOGY | Facility: HOSPITAL | Age: 22
End: 2017-12-11

## 2017-12-11 VITALS
HEART RATE: 85 BPM | HEIGHT: 60 IN | BODY MASS INDEX: 47.71 KG/M2 | RESPIRATION RATE: 20 BRPM | DIASTOLIC BLOOD PRESSURE: 49 MMHG | WEIGHT: 243 LBS | TEMPERATURE: 98 F | SYSTOLIC BLOOD PRESSURE: 109 MMHG

## 2017-12-11 DIAGNOSIS — N89.8 CLEAR VAGINAL DISCHARGE: ICD-10-CM

## 2017-12-11 PROCEDURE — 87591 N.GONORRHOEAE DNA AMP PROB: CPT

## 2017-12-11 PROCEDURE — 87081 CULTURE SCREEN ONLY: CPT

## 2017-12-11 PROCEDURE — 99211 OFF/OP EST MAY X REQ PHY/QHP: CPT

## 2017-12-11 RX ORDER — ONDANSETRON 8 MG/1
8 TABLET, ORALLY DISINTEGRATING ORAL EVERY 8 HOURS PRN
Status: DISCONTINUED | OUTPATIENT
Start: 2017-12-11 | End: 2017-12-12 | Stop reason: HOSPADM

## 2017-12-11 RX ORDER — ACETAMINOPHEN 500 MG
500 TABLET ORAL EVERY 6 HOURS PRN
Status: DISCONTINUED | OUTPATIENT
Start: 2017-12-11 | End: 2017-12-12 | Stop reason: HOSPADM

## 2017-12-11 NOTE — SUBJECTIVE & OBJECTIVE
17 at 1715hrs  Obstetric HPI:  Patient reports None contractions, active fetal movement, No vaginal bleeding , Yes loss of fluid     This pregnancy has been complicated by anemia, bipolar, tobacco use, gastric sleeve, HSV and presently taking Z-Anjel for URTI    Obstetric History       T3      L3     SAB0   TAB0   Ectopic0   Multiple0   Live Births3       # Outcome Date GA Lbr Rene/2nd Weight Sex Delivery Anes PTL Lv   4 Current            3 Term 16 37w6d / 00:03 3.195 kg (7 lb 0.7 oz) M Vag-Spont EPI N JAVI      Apgar1:  8                Apgar5: 8   2 Term 10/03/14   3.09 kg (6 lb 13 oz)  Vag-Spont   JAVI   1 Term 12   3.544 kg (7 lb 13 oz) F Vag-Spont   JAVI        Past Medical History:   Diagnosis Date    Anemia     Anxiety     Bipolar 1 disorder     Morbid obesity     pt weighed 400# prior to gastric sleeve surgery    Tobacco use disorder      Past Surgical History:   Procedure Laterality Date    ABDOMINAL SURGERY      gastric sleeve      TONSILLECTOMY         PTA Medications   Medication Sig    [] azithromycin (Z-ANJEL) 250 MG tablet Take 2 tablets by mouth on day 1; Take 1 tablet by mouth on days 2-5    hydrOXYzine pamoate (VISTARIL) 25 MG Cap Take 1 capsule (25 mg total) by mouth 4 (four) times daily.    PRENATAL VIT,ANNA 74/IRON/FOLIC (PRENATAL VITAMIN 1+1 ORAL) Take by mouth.    ziprasidone (GEODON) 80 MG capsule Take 80 mg by mouth once daily.       Review of patient's allergies indicates:  No Known Allergies     Family History     None        Social History Main Topics    Smoking status: Heavy Tobacco Smoker     Packs/day: 1.00     Types: Cigarettes     Last attempt to quit: 2/10/2014    Smokeless tobacco: Never Used    Alcohol use No    Drug use: No    Sexual activity: Yes     Partners: Male     Birth control/ protection: None     Review of Systems   Constitutional: Negative.    HENT:        Denies headaches or visual disturbances   Respiratory: Negative for  shortness of breath.    Cardiovascular: Negative for leg swelling.   Gastrointestinal: Negative for abdominal pain.   Genitourinary: Negative for dysuria, flank pain, frequency, vaginal bleeding, vaginal discharge and vaginal odor.   Musculoskeletal: Negative for back pain.   Skin:  Negative for rash.   Neurological: Negative for syncope, numbness and headaches.   Psychiatric/Behavioral: Negative for depression.      Objective:     Vital Signs (Most Recent):  Temp: 98.4 °F (36.9 °C) (12/11/17 1647)  Pulse: 85 (12/11/17 1619)  Resp: 20 (12/11/17 1647)  BP: (!) 109/49 (12/11/17 1619) Vital Signs (24h Range):  Temp:  [98.4 °F (36.9 °C)] 98.4 °F (36.9 °C)  Pulse:  [85] 85  Resp:  [20] 20  BP: (109)/(49) 109/49     Weight: 110.2 kg (243 lb)  Body mass index is 47.46 kg/m².    FHT: 140bpm  Cat 1 (reassuring)  TOCO:  Q none minutes    Physical Exam    Cervix:             SSE- Negative nitrazine/pooling. Neg wet prep  GC/CMZ and GBS performed  Dilation:  0  Effacement:  70%  Station: -3  Presentation: Vertex     Significant Labs:  Lab Results   Component Value Date    GROUPTRH O POS 09/13/2017    HEPBSAG Negative 09/13/2017    STREPBCULT No Group B Streptococcus isolated 03/24/2016       I have personallly reviewed all pertinent lab results from the last 24 hours.

## 2017-12-11 NOTE — ASSESSMENT & PLAN NOTE
12/11/17 at 1715hrs- SSE, No evidence of SROM Advise to complete Abx. May DC home with PTL precautions with reactive NST and keep appt 12/19/17 as scheduled

## 2017-12-12 LAB
C TRACH DNA SPEC QL NAA+PROBE: NOT DETECTED
N GONORRHOEA DNA SPEC QL NAA+PROBE: NOT DETECTED

## 2017-12-12 PROCEDURE — 99213 OFFICE O/P EST LOW 20 MIN: CPT | Mod: 25,TH,S$PBB, | Performed by: ADVANCED PRACTICE MIDWIFE

## 2017-12-12 PROCEDURE — 59025 FETAL NON-STRESS TEST: CPT | Mod: 26,,, | Performed by: ADVANCED PRACTICE MIDWIFE

## 2017-12-12 PROCEDURE — 59025 FETAL NON-STRESS TEST: CPT

## 2017-12-12 NOTE — DISCHARGE SUMMARY
"[facility]  Obstetrics  Discharge Summary      Patient Name: Sybil Bal  MRN: [id number]  Admission Date: 2017  Hospital Length of Stay: 0 days  Discharge Date and Time: No discharge date for patient encounter.  Attending Physician: Payal Quiroz MD   Discharging Provider: Brenda Meyer CNM  Primary Care Provider: Clary Monroe MD    HPI: 17 at 1715hrs 22yr old  with IUP 34w6d presents C/O "leaking today"    * No surgery found *     Hospital Course:   17 at 1715hrs- Outpatient  NST, SSE, GBS, Wet prep        Final Active Diagnoses:    Diagnosis Date Noted POA    PRINCIPAL PROBLEM:  Clear vaginal discharge [N89.8] 08/10/2014 Yes      Problems Resolved During this Admission:    Diagnosis Date Noted Date Resolved POA        Labs: All labs within the past 24 hours have been reviewed    Feeding Method: NA    Immunizations     None          This patient has no babies on file.  Pending Diagnostic Studies:     None          Discharged Condition: good    Disposition: Home or Self Care    Follow Up:  Follow-up Information     Follow up In 1 week.    Why:  As needed               Patient Instructions:     Diet general     Activity as tolerated     Other restrictions (specify):   Order Comments: Avoid driving for 2 weeks     Call MD for:  temperature >100.4     Call MD for:  severe uncontrolled pain     Call MD for:  severe persistent headache     Call MD for:   Order Comments: Depression, increased bleeding, fever, breast issues or any concerns       Medications:  Current Discharge Medication List      CONTINUE these medications which have NOT CHANGED    Details   hydrOXYzine pamoate (VISTARIL) 25 MG Cap Take 1 capsule (25 mg total) by mouth 4 (four) times daily.  Qty: 30 capsule, Refills: 2      PRENATAL VIT,ANNA 74/IRON/FOLIC (PRENATAL VITAMIN 1+1 ORAL) Take by mouth.      ziprasidone (GEODON) 80 MG capsule Take 80 mg by mouth once daily.         STOP taking these " medications       azithromycin (Z-MITCH) 250 MG tablet Comments:   Reason for Stopping:             TANIA Meyer CNM  Obstetrics  [facility]

## 2017-12-12 NOTE — NURSING
Pt  34w6d came to L&D triage for r/o rapture of membrane. Speculum done by CNM. Negative Nitrazine, negative wet prep. SVE close cervix. On FSE and toco monitor. FHT baseline @125 bpm. No contractions.

## 2017-12-12 NOTE — PROCEDURES
Sybil Bal is a 22 y.o. female patient.    Temp: 98.4 °F (36.9 °C) (12/11/17 1647)  Pulse: 85 (12/11/17 1619)  Resp: 20 (12/11/17 1647)  BP: (!) 109/49 (12/11/17 1619)  Weight: 110.2 kg (243 lb) (12/11/17 1646)  Height: 5' (152.4 cm) (12/11/17 1646)       Obtain Fetal nonstress test (NST)  Date/Time: 12/12/2017 12:03 AM  Performed by: APOLLO COELHO  Authorized by: APOLLO COELHO     Nonstress Test:     Variability:  6-25 BPM    Decelerations:  None    Accelerations:  15 bpm    Acoustic Stimulator: No      Uterine Irritability: No      Contractions:  Not present  Biophysical Profile:     Nonstress Test Interpretation: reactive      Overall Impression:  Reassuring  Post-procedure:     Patient tolerance:  Patient tolerated the procedure well with no immediate complications      TANIA Coelho  12/12/2017

## 2017-12-12 NOTE — H&P
"[facility] -   Obstetrics  History & Physical    Patient Name: Sybil Bal  MRN: [id number]  Admission Date: 2017  Primary Care Provider: Clary Monroe MD    Subjective:     Principal Problem:Clear vaginal discharge    History of Present Illness:  17 at 1715hrs 22yr old  with IUP 34w6d presents C/O "leaking today    17 at 1715hrs  Obstetric HPI:  Patient reports None contractions, active fetal movement, No vaginal bleeding , Yes loss of fluid     This pregnancy has been complicated by anemia, bipolar, tobacco use, gastric sleeve, HSV and presently taking Z-Anjel for URTI    Obstetric History       T3      L3     SAB0   TAB0   Ectopic0   Multiple0   Live Births3       # Outcome Date GA Lbr Rene/2nd Weight Sex Delivery Anes PTL Lv   4 Current            3 Term 16 37w6d / 00:03 3.195 kg (7 lb 0.7 oz) M Vag-Spont EPI N JAVI      Apgar1:  8                Apgar5: 8   2 Term 10/03/14   3.09 kg (6 lb 13 oz)  Vag-Spont   JAVI   1 Term 12   3.544 kg (7 lb 13 oz) F Vag-Spont   JAVI        Past Medical History:   Diagnosis Date    Anemia     Anxiety     Bipolar 1 disorder     Morbid obesity     pt weighed 400# prior to gastric sleeve surgery    Tobacco use disorder      Past Surgical History:   Procedure Laterality Date    ABDOMINAL SURGERY      gastric sleeve      TONSILLECTOMY         PTA Medications   Medication Sig    [] azithromycin (Z-ANJEL) 250 MG tablet Take 2 tablets by mouth on day 1; Take 1 tablet by mouth on days 2-5    hydrOXYzine pamoate (VISTARIL) 25 MG Cap Take 1 capsule (25 mg total) by mouth 4 (four) times daily.    PRENATAL VIT,ANNA 74/IRON/FOLIC (PRENATAL VITAMIN 1+1 ORAL) Take by mouth.    ziprasidone (GEODON) 80 MG capsule Take 80 mg by mouth once daily.       Review of patient's allergies indicates:  No Known Allergies     Family History     None        Social History Main Topics    Smoking status: Heavy Tobacco Smoker     " Packs/day: 1.00     Types: Cigarettes     Last attempt to quit: 2/10/2014    Smokeless tobacco: Never Used    Alcohol use No    Drug use: No    Sexual activity: Yes     Partners: Male     Birth control/ protection: None     Review of Systems   Constitutional: Negative.    HENT:        Denies headaches or visual disturbances   Respiratory: Negative for shortness of breath.    Cardiovascular: Negative for leg swelling.   Gastrointestinal: Negative for abdominal pain.   Genitourinary: Negative for dysuria, flank pain, frequency, vaginal bleeding, vaginal discharge and vaginal odor.   Musculoskeletal: Negative for back pain.   Skin:  Negative for rash.   Neurological: Negative for syncope, numbness and headaches.   Psychiatric/Behavioral: Negative for depression.      Objective:     Vital Signs (Most Recent):  Temp: 98.4 °F (36.9 °C) (17)  Pulse: 85 (17)  Resp: 20 (17)  BP: (!) 109/49 (17) Vital Signs (24h Range):  Temp:  [98.4 °F (36.9 °C)] 98.4 °F (36.9 °C)  Pulse:  [85] 85  Resp:  [20] 20  BP: (109)/(49) 109/49     Weight: 110.2 kg (243 lb)  Body mass index is 47.46 kg/m².    FHT: 140bpm  Cat 1 (reassuring)  TOCO:  Q none minutes    Physical Exam    Cervix:             SSE- Negative nitrazine/pooling. Neg wet prep  GC/CMZ and GBS performed  Dilation:  0  Effacement:  70%  Station: -3  Presentation: Vertex     Significant Labs:  Lab Results   Component Value Date    GROUPTRH O POS 2017    HEPBSAG Negative 2017    STREPBCULT No Group B Streptococcus isolated 2016       I have personallly reviewed all pertinent lab results from the last 24 hours.    Assessment/Plan:     22 y.o. female  at 34w6d for:    * Clear vaginal discharge    17 at 1715hrs- SSE, No evidence of SROM Advise to complete Abx. May DC home with PTL precautions with reactive NST and keep appt 17 as scheduled            Brenda Meyer CNM  Obstetrics  [facility]

## 2017-12-13 ENCOUNTER — HOSPITAL ENCOUNTER (OUTPATIENT)
Facility: HOSPITAL | Age: 22
Discharge: HOME | End: 2017-12-13
Attending: OBSTETRICS & GYNECOLOGY | Admitting: OBSTETRICS & GYNECOLOGY
Payer: MEDICAID

## 2017-12-13 VITALS — SYSTOLIC BLOOD PRESSURE: 134 MMHG | HEART RATE: 73 BPM | TEMPERATURE: 99 F | DIASTOLIC BLOOD PRESSURE: 66 MMHG

## 2017-12-13 DIAGNOSIS — O26.893 PELVIC PAIN AFFECTING PREGNANCY IN THIRD TRIMESTER, ANTEPARTUM: ICD-10-CM

## 2017-12-13 DIAGNOSIS — R10.2 PELVIC PAIN AFFECTING PREGNANCY IN THIRD TRIMESTER, ANTEPARTUM: ICD-10-CM

## 2017-12-13 LAB — BACTERIA SPEC AEROBE CULT: NORMAL

## 2017-12-13 PROCEDURE — 99211 OFF/OP EST MAY X REQ PHY/QHP: CPT

## 2017-12-13 PROCEDURE — 99213 OFFICE O/P EST LOW 20 MIN: CPT | Mod: TH,S$PBB,, | Performed by: ADVANCED PRACTICE MIDWIFE

## 2017-12-13 RX ORDER — ACETAMINOPHEN 500 MG
500 TABLET ORAL EVERY 6 HOURS PRN
Status: DISCONTINUED | OUTPATIENT
Start: 2017-12-13 | End: 2017-12-13 | Stop reason: HOSPADM

## 2017-12-13 RX ORDER — ONDANSETRON 8 MG/1
8 TABLET, ORALLY DISINTEGRATING ORAL EVERY 8 HOURS PRN
Status: DISCONTINUED | OUTPATIENT
Start: 2017-12-13 | End: 2017-12-13 | Stop reason: HOSPADM

## 2017-12-13 NOTE — H&P
"[facility] -   Obstetrics  History & Physical    Patient Name: Sybil Bal  MRN: [id number]  Admission Date: 2017  Primary Care Provider: Clary Monroe MD    Subjective:     Principal Problem:Pelvic pain affecting pregnancy in third trimester, antepartum    History of Present Illness:  23yo  JEET 18 EGA 35w1d arrived c/o pressure "feel like I need to push"    Obstetric HPI:    This pregnancy has been complicated by lapse in care near 28 wks, obesity, HSV    Obstetric History       T3      L3     SAB0   TAB0   Ectopic0   Multiple0   Live Births3       # Outcome Date GA Lbr Rene/2nd Weight Sex Delivery Anes PTL Lv   4 Current            3 Term 16 37w6d / 00:03 3.195 kg (7 lb 0.7 oz) M Vag-Spont EPI N JAVI      Apgar1:  8                Apgar5: 8   2 Term 10/03/14   3.09 kg (6 lb 13 oz)  Vag-Spont   JAVI   1 Term 12   3.544 kg (7 lb 13 oz) F Vag-Spont   JAVI        Past Medical History:   Diagnosis Date    Anemia     Anxiety     Bipolar 1 disorder     Morbid obesity     pt weighed 400# prior to gastric sleeve surgery    Tobacco use disorder      Past Surgical History:   Procedure Laterality Date    ABDOMINAL SURGERY      gastric sleeve      TONSILLECTOMY         PTA Medications   Medication Sig    hydrOXYzine pamoate (VISTARIL) 25 MG Cap Take 1 capsule (25 mg total) by mouth 4 (four) times daily.    PRENATAL VIT,ANNA 74/IRON/FOLIC (PRENATAL VITAMIN 1+1 ORAL) Take by mouth.    ziprasidone (GEODON) 80 MG capsule Take 80 mg by mouth once daily.       Review of patient's allergies indicates:  No Known Allergies     Family History     None        Social History Main Topics    Smoking status: Heavy Tobacco Smoker     Packs/day: 1.00     Types: Cigarettes     Last attempt to quit: 2/10/2014    Smokeless tobacco: Never Used    Alcohol use No    Drug use: No    Sexual activity: Yes     Partners: Male     Birth control/ protection: None     Review of " Systems   All other systems reviewed and are negative.     Objective:     Vital Signs (Most Recent):    Vital Signs (24h Range):           There is no height or weight on file to calculate BMI.    FHT: Cat 1 (reassuring)  TOCO: Rare UC    Physical Exam:   Constitutional: She is oriented to person, place, and time. She appears well-developed and well-nourished.      Neck: Normal range of motion.     Pulmonary/Chest: Effort normal.        Abdominal: Soft.             Musculoskeletal: Normal range of motion.       Neurological: She is alert and oriented to person, place, and time. She has normal reflexes.    Skin: Skin is warm and dry.    Psychiatric: She has a normal mood and affect. Her behavior is normal.       Cervix:   Dilation:  2 cm per RN, posterior       Significant Labs:  Lab Results   Component Value Date    GROUPTRH O POS 2017    HEPBSAG Negative 2017    STREPBCULT No Group B Streptococcus isolated 2017       I have personallly reviewed all pertinent lab results from the last 24 hours.    Assessment/Plan:     22 y.o. female  at 35w1d for:    * Pelvic pain affecting pregnancy in third trimester, antepartum    EFM, SVE, exp will observe. Pt denies constipation. Exp normal discomfort near term, G4            Gabbi Aguirre CNM  Obstetrics  [facility]

## 2017-12-13 NOTE — SUBJECTIVE & OBJECTIVE
Obstetric HPI:    This pregnancy has been complicated by lapse in care near 28 wks, obesity, HSV    Obstetric History       T3      L3     SAB0   TAB0   Ectopic0   Multiple0   Live Births3       # Outcome Date GA Lbr Rene/2nd Weight Sex Delivery Anes PTL Lv   4 Current            3 Term 16 37w6d / 00:03 3.195 kg (7 lb 0.7 oz) M Vag-Spont EPI N JAVI      Apgar1:  8                Apgar5: 8   2 Term 10/03/14   3.09 kg (6 lb 13 oz)  Vag-Spont   JAVI   1 Term 12   3.544 kg (7 lb 13 oz) F Vag-Spont   JAVI        Past Medical History:   Diagnosis Date    Anemia     Anxiety     Bipolar 1 disorder     Morbid obesity     pt weighed 400# prior to gastric sleeve surgery    Tobacco use disorder      Past Surgical History:   Procedure Laterality Date    ABDOMINAL SURGERY      gastric sleeve      TONSILLECTOMY         PTA Medications   Medication Sig    hydrOXYzine pamoate (VISTARIL) 25 MG Cap Take 1 capsule (25 mg total) by mouth 4 (four) times daily.    PRENATAL VIT,ANNA 74/IRON/FOLIC (PRENATAL VITAMIN 1+1 ORAL) Take by mouth.    ziprasidone (GEODON) 80 MG capsule Take 80 mg by mouth once daily.       Review of patient's allergies indicates:  No Known Allergies     Family History     None        Social History Main Topics    Smoking status: Heavy Tobacco Smoker     Packs/day: 1.00     Types: Cigarettes     Last attempt to quit: 2/10/2014    Smokeless tobacco: Never Used    Alcohol use No    Drug use: No    Sexual activity: Yes     Partners: Male     Birth control/ protection: None     Review of Systems   All other systems reviewed and are negative.     Objective:     Vital Signs (Most Recent):    Vital Signs (24h Range):           There is no height or weight on file to calculate BMI.    FHT: Cat 1 (reassuring)  TOCO: Rare UC    Physical Exam:   Constitutional: She is oriented to person, place, and time. She appears well-developed and well-nourished.      Neck: Normal range of motion.      Pulmonary/Chest: Effort normal.        Abdominal: Soft.             Musculoskeletal: Normal range of motion.       Neurological: She is alert and oriented to person, place, and time. She has normal reflexes.    Skin: Skin is warm and dry.    Psychiatric: She has a normal mood and affect. Her behavior is normal.       Cervix:   Dilation:  2 cm per RN, posterior       Significant Labs:  Lab Results   Component Value Date    GROUPTRH O POS 09/13/2017    HEPBSAG Negative 09/13/2017    STREPBCULT No Group B Streptococcus isolated 12/11/2017       I have personallly reviewed all pertinent lab results from the last 24 hours.

## 2017-12-15 ENCOUNTER — HOSPITAL ENCOUNTER (OUTPATIENT)
Facility: HOSPITAL | Age: 22
Discharge: HOME | End: 2017-12-15
Attending: OBSTETRICS & GYNECOLOGY | Admitting: OBSTETRICS & GYNECOLOGY
Payer: MEDICAID

## 2017-12-15 VITALS
SYSTOLIC BLOOD PRESSURE: 132 MMHG | RESPIRATION RATE: 20 BRPM | HEART RATE: 91 BPM | BODY MASS INDEX: 47.71 KG/M2 | WEIGHT: 243 LBS | HEIGHT: 60 IN | TEMPERATURE: 98 F | DIASTOLIC BLOOD PRESSURE: 83 MMHG

## 2017-12-15 DIAGNOSIS — R05.3 PERSISTENT COUGH FOR 3 WEEKS OR LONGER: ICD-10-CM

## 2017-12-15 DIAGNOSIS — R10.9 ABDOMINAL CRAMPING: ICD-10-CM

## 2017-12-15 PROCEDURE — 99211 OFF/OP EST MAY X REQ PHY/QHP: CPT | Mod: 25

## 2017-12-15 PROCEDURE — 59025 FETAL NON-STRESS TEST: CPT

## 2017-12-15 PROCEDURE — 25000003 PHARM REV CODE 250: Performed by: OBSTETRICS & GYNECOLOGY

## 2017-12-15 PROCEDURE — 99224 PR SUBSEQUENT OBSERVATION CARE,LEVEL I: CPT | Mod: ,,, | Performed by: OBSTETRICS & GYNECOLOGY

## 2017-12-15 RX ORDER — VALACYCLOVIR HYDROCHLORIDE 500 MG/1
500 TABLET, FILM COATED ORAL DAILY
Status: DISCONTINUED | OUTPATIENT
Start: 2017-12-16 | End: 2017-12-15 | Stop reason: HOSPADM

## 2017-12-15 RX ORDER — PROMETHAZINE HYDROCHLORIDE 12.5 MG/1
12.5 TABLET ORAL EVERY 6 HOURS PRN
Status: DISCONTINUED | OUTPATIENT
Start: 2017-12-15 | End: 2017-12-15 | Stop reason: HOSPADM

## 2017-12-15 RX ORDER — PROMETHAZINE HYDROCHLORIDE AND CODEINE PHOSPHATE 6.25; 1 MG/5ML; MG/5ML
5 SOLUTION ORAL EVERY 6 HOURS PRN
Status: DISCONTINUED | OUTPATIENT
Start: 2017-12-15 | End: 2017-12-15 | Stop reason: HOSPADM

## 2017-12-15 RX ORDER — ACETAMINOPHEN 500 MG
500 TABLET ORAL EVERY 6 HOURS PRN
Status: DISCONTINUED | OUTPATIENT
Start: 2017-12-15 | End: 2017-12-15 | Stop reason: HOSPADM

## 2017-12-15 RX ORDER — VALACYCLOVIR HYDROCHLORIDE 500 MG/1
500 TABLET, FILM COATED ORAL DAILY
Qty: 30 TABLET | Refills: 1 | Status: ON HOLD | OUTPATIENT
Start: 2017-12-16 | End: 2018-01-26 | Stop reason: HOSPADM

## 2017-12-15 RX ORDER — ONDANSETRON 8 MG/1
8 TABLET, ORALLY DISINTEGRATING ORAL EVERY 8 HOURS PRN
Status: DISCONTINUED | OUTPATIENT
Start: 2017-12-15 | End: 2017-12-15 | Stop reason: HOSPADM

## 2017-12-15 RX ORDER — PROMETHAZINE HYDROCHLORIDE AND CODEINE PHOSPHATE 6.25; 1 MG/5ML; MG/5ML
5 SOLUTION ORAL EVERY 6 HOURS PRN
Qty: 240 ML | Refills: 0 | Status: SHIPPED | OUTPATIENT
Start: 2017-12-15 | End: 2017-12-19

## 2017-12-15 RX ADMIN — PROMETHAZINE HYDROCHLORIDE AND CODEINE PHOSPHATE 5 ML: 10; 6.25 SOLUTION ORAL at 10:12

## 2017-12-15 NOTE — DISCHARGE SUMMARY
"[facility] -   Obstetrics  Discharge Summary      Patient Name: Sybil Bal  MRN: [id number]  Admission Date: 12/15/2017  Hospital Length of Stay: 0 days  Discharge Date and Time:  12/15/2017 10:10 AM  Attending Physician: Rianna Gama MD   Discharging Provider: Sonia Newman CNM  Primary Care Provider: Clary Monroe MD    HPI:  @ 35.3 , persistent cough x "2 months with vomiting and severe rib pain".  Completed Z-Pack with "no improvement".       * No surgery found *     Hospital Course:   12/15/17. Placed on CFM. Occasional ctx. No evidence of active labor. Cervix /ballotable.  Persistent cough with patient crying out in pain and gagging at present. Lungs sound clear. VSS.   CXR done to r/o pneumonia or rib involvement.   CXR NEGATIVE.   Patient still smoking. Counseled again on  Dangers of tobacco use and how complicates respiratory status re-iterated!!  Will provide RX for cough/vomiting with elixer to use over next 3-5 days for rest.   Keep next appt as scheduled in office.      Precautions discussed specifically with patient regarding possible drug interaction that may occur while on Geodon and codeine and advised to not take Geodon while taking cough/nausea elixir as prescribed.         Final Active Diagnoses:    Diagnosis Date Noted POA    PRINCIPAL PROBLEM:  Persistent cough for 3 weeks or longer [R05] 12/15/2017 Yes    Abdominal cramping [R10.9] 12/15/2017 Yes      Problems Resolved During this Admission:    Diagnosis Date Noted Date Resolved POA        Labs: All labs within the past 24 hours have been reviewed    Feeding Method: undelivered    Immunizations     None          This patient has no babies on file.  Pending Diagnostic Studies:     None          Discharged Condition: stable    Disposition:     Follow Up:  Follow-up Information     Please follow up.    Why:  with provider as scheduled               Patient Instructions:   No discharge procedures on " file.  Medications:  Current Discharge Medication List      START taking these medications    Details   promethazine-codeine 6.25-10 mg/5 ml (PHENERGAN WITH CODEINE) 6.25-10 mg/5 mL syrup Take 5 mLs by mouth every 6 (six) hours as needed for Cough.  Qty: 240 mL, Refills: 0      valACYclovir (VALTREX) 500 MG tablet Take 1 tablet (500 mg total) by mouth once daily.  Qty: 30 tablet, Refills: 1         CONTINUE these medications which have NOT CHANGED    Details   PRENATAL VIT,ANNA 74/IRON/FOLIC (PRENATAL VITAMIN 1+1 ORAL) Take by mouth.      ziprasidone (GEODON) 80 MG capsule Take 80 mg by mouth once daily.      hydrOXYzine pamoate (VISTARIL) 25 MG Cap Take 1 capsule (25 mg total) by mouth 4 (four) times daily.  Qty: 30 capsule, Refills: 2             Sonia Newman CNM  Obstetrics  [facility]

## 2017-12-15 NOTE — ASSESSMENT & PLAN NOTE
Patient discharged with RX for promethazine /codeine elixer for persistent cough with nausea and vomiting. Will be for 4-5 days use. Precautions given re: interactions between Narcotics and CNS depressants and advised to stop Geodon for next 4 days while on cough preparation.

## 2017-12-15 NOTE — HOSPITAL COURSE
12/15/17. Placed on CFM. Occasional ctx. No evidence of active labor. Cervix 2/60/ballotable.  Persistent cough with patient crying out in pain and gagging at present. Lungs sound clear. VSS.   CXR done to r/o pneumonia or rib involvement.   CXR NEGATIVE.   Patient still smoking. Counseled again on  Dangers of tobacco use and how complicates respiratory status re-iterated!!  Will provide RX for cough/vomiting with elixer to use over next 3-5 days for rest.   Keep next appt as scheduled in office.

## 2017-12-15 NOTE — SUBJECTIVE & OBJECTIVE
Obstetric HPI:  Persistent cough with n/v / severe rib pain.   Patient reports mild/ irregular contractions, active fetal movement, No vaginal bleeding , No loss of fluid     This pregnancy has been complicated by tobacco use, pelvic pain, bipolar disorder, HSV 2 infection. (prophylaxis to start now).     Obstetric History       T3      L3     SAB0   TAB0   Ectopic0   Multiple0   Live Births3       # Outcome Date GA Lbr Rene/2nd Weight Sex Delivery Anes PTL Lv   4 Current            3 Term 16 37w6d / 00:03 3.195 kg (7 lb 0.7 oz) M Vag-Spont EPI N JAVI      Apgar1:  8                Apgar5: 8   2 Term 10/03/14   3.09 kg (6 lb 13 oz)  Vag-Spont   JAVI   1 Term 12   3.544 kg (7 lb 13 oz) F Vag-Spont   JAVI        Past Medical History:   Diagnosis Date    Anemia     Anxiety     Bipolar 1 disorder     Morbid obesity     pt weighed 400# prior to gastric sleeve surgery    Tobacco use disorder      Past Surgical History:   Procedure Laterality Date    ABDOMINAL SURGERY      gastric sleeve      TONSILLECTOMY         PTA Medications   Medication Sig    PRENATAL VIT,ANNA 74/IRON/FOLIC (PRENATAL VITAMIN 1+1 ORAL) Take by mouth.    ziprasidone (GEODON) 80 MG capsule Take 80 mg by mouth once daily.    hydrOXYzine pamoate (VISTARIL) 25 MG Cap Take 1 capsule (25 mg total) by mouth 4 (four) times daily.       Review of patient's allergies indicates:  No Known Allergies     Family History     None        Social History Main Topics    Smoking status: Heavy Tobacco Smoker     Packs/day: 1.00     Types: Cigarettes     Last attempt to quit: 2/10/2014    Smokeless tobacco: Never Used    Alcohol use No    Drug use: No    Sexual activity: Yes     Partners: Male     Birth control/ protection: None     Review of Systems   Constitutional: Negative.    HENT: Negative.    Eyes: Negative.    Respiratory: Positive for cough and shortness of breath.         Completed Z-pack   Cardiovascular: Negative.     Gastrointestinal: Positive for abdominal pain, nausea and vomiting.   Genitourinary: Positive for pelvic pain, vaginal pain and urinary incontinence.        Due to persistent coughing   Musculoskeletal: Positive for back pain.   Skin:  Negative.   Neurological: Negative.    Psychiatric/Behavioral: The patient is nervous/anxious.       Objective:     Vital Signs (Most Recent):  Temp: 98.4 °F (36.9 °C) (12/15/17 0702)  Pulse: 91 (12/15/17 0747)  Resp: 20 (12/15/17 0702)  BP: 132/83 (12/15/17 0747) Vital Signs (24h Range):  Temp:  [98.4 °F (36.9 °C)] 98.4 °F (36.9 °C)  Pulse:  [79-91] 91  Resp:  [20] 20  BP: (132-141)/(71-83) 132/83     Weight: 110.2 kg (243 lb)  Body mass index is 47.46 kg/m².    FHT: Cat 1 (reassuring)  TOCO: Irregular/mild    Physical Exam    Cervix:  Dilation:  2  Effacement:  60  Station: -3  Presentation: Vertex     Significant Labs:  Lab Results   Component Value Date    GROUPTRH O POS 09/13/2017    HEPBSAG Negative 09/13/2017    STREPBCULT No Group B Streptococcus isolated 12/11/2017       I have personallly reviewed all pertinent lab results from the last 24 hours.

## 2017-12-15 NOTE — NURSING
Discharge instructions discussed with pt and spouse.  Codeine cough medicine and Geodon drug interactions discussed with pt and spouse by myself and CNM.  Both understand risks of taking cough medicine and Geodon together with increased risk of respiratory depression.  Pt states she takes Geodon at night, but did not take it last night, so asking if it would be possible to take Geodon tonight if she takes the cough medicine now and does not have the desired effects.  Instructed she can take Geodon tonight if she does not take any other doses of the cough medicine other than the one given now.

## 2017-12-15 NOTE — PROCEDURES
SHANNAN    12/15/2017  35w3d  Indication for Test: persistent cough with abdominal pain.   Nonstress Test  Variability: Moderate  Decelerations: None  Accelerations: Yes  Acoustic Stimulator: No  Baseline: 125 BPM  Uterine Irritability: No  Minutes Between Contractions: 5 min  Interpretation  Nonstress Test Interpretation: Reactive  Overall Impression: Reassuring  Fetal Heart Rate (PeriCALM)  Fetal Monitor Mode 1: Ultrasound  Baseline FHR 1: 115  Variability 1: Moderate (6-25)  Accels 1: Yes  Decels 1: None

## 2017-12-15 NOTE — HPI
" @ 35.3 , persistent cough x "2 months with vomiting and severe rib pain".  Completed Z-Pack with "no improvement".     "

## 2017-12-19 ENCOUNTER — TELEPHONE (OUTPATIENT)
Dept: OBSTETRICS AND GYNECOLOGY | Facility: CLINIC | Age: 22
End: 2017-12-19

## 2017-12-19 ENCOUNTER — ROUTINE PRENATAL (OUTPATIENT)
Dept: OBSTETRICS AND GYNECOLOGY | Facility: CLINIC | Age: 22
End: 2017-12-19
Payer: MEDICAID

## 2017-12-19 VITALS
BODY MASS INDEX: 48.01 KG/M2 | WEIGHT: 245.81 LBS | SYSTOLIC BLOOD PRESSURE: 116 MMHG | DIASTOLIC BLOOD PRESSURE: 78 MMHG

## 2017-12-19 DIAGNOSIS — Z34.83 ENCOUNTER FOR SUPERVISION OF OTHER NORMAL PREGNANCY IN THIRD TRIMESTER: Primary | ICD-10-CM

## 2017-12-19 PROBLEM — R10.2 PELVIC PAIN AFFECTING PREGNANCY IN THIRD TRIMESTER, ANTEPARTUM: Status: RESOLVED | Noted: 2017-12-13 | Resolved: 2017-12-19

## 2017-12-19 PROBLEM — R05.3 PERSISTENT COUGH FOR 3 WEEKS OR LONGER: Status: RESOLVED | Noted: 2017-12-15 | Resolved: 2017-12-19

## 2017-12-19 PROBLEM — R10.9 ABDOMINAL PAIN: Status: RESOLVED | Noted: 2017-11-30 | Resolved: 2017-12-19

## 2017-12-19 PROBLEM — R10.9 ABDOMINAL CRAMPING: Status: RESOLVED | Noted: 2017-12-15 | Resolved: 2017-12-19

## 2017-12-19 PROBLEM — O26.893 PELVIC PAIN AFFECTING PREGNANCY IN THIRD TRIMESTER, ANTEPARTUM: Status: RESOLVED | Noted: 2017-12-13 | Resolved: 2017-12-19

## 2017-12-19 PROCEDURE — 99213 OFFICE O/P EST LOW 20 MIN: CPT | Mod: TH,S$PBB,, | Performed by: ADVANCED PRACTICE MIDWIFE

## 2017-12-19 PROCEDURE — 99999 PR PBB SHADOW E&M-EST. PATIENT-LVL III: CPT | Mod: PBBFAC,,, | Performed by: ADVANCED PRACTICE MIDWIFE

## 2017-12-19 PROCEDURE — 99213 OFFICE O/P EST LOW 20 MIN: CPT | Mod: PBBFAC,PO | Performed by: ADVANCED PRACTICE MIDWIFE

## 2017-12-19 NOTE — TELEPHONE ENCOUNTER
----- Message from Sera Lindsey sent at 12/19/2017  2:22 PM CST -----  Contact: pt  States she needs to reschedule her appt on 12/28 at 9:15 and she needs something in the afternoon. Please call pt at [phone]. Thank you

## 2017-12-19 NOTE — PROGRESS NOTES
Doing well  Complaining of pelvic pressure  PTL talk  GBS negative, pt aware  Sinus infection much better    Coffective counseling sheet Protect Breastfeeding discussed with mother. Reinforced avoidence of artifical nipples and formula, unless medically indicated.  Encouraged mother to download Coffective mobile loree if she has not already done so. Mother verbalizes understanding.

## 2017-12-26 ENCOUNTER — HOSPITAL ENCOUNTER (OUTPATIENT)
Facility: HOSPITAL | Age: 22
Discharge: HOME | End: 2017-12-26
Attending: OBSTETRICS & GYNECOLOGY | Admitting: OBSTETRICS & GYNECOLOGY
Payer: MEDICAID

## 2017-12-26 VITALS
SYSTOLIC BLOOD PRESSURE: 128 MMHG | RESPIRATION RATE: 18 BRPM | HEART RATE: 72 BPM | DIASTOLIC BLOOD PRESSURE: 63 MMHG | TEMPERATURE: 99 F

## 2017-12-26 DIAGNOSIS — G43.009 MIGRAINE WITHOUT AURA AND WITHOUT STATUS MIGRAINOSUS, NOT INTRACTABLE: ICD-10-CM

## 2017-12-26 DIAGNOSIS — G43.909 MIGRAINE HEADACHE: ICD-10-CM

## 2017-12-26 PROCEDURE — 25000003 PHARM REV CODE 250: Performed by: ADVANCED PRACTICE MIDWIFE

## 2017-12-26 PROCEDURE — 99211 OFF/OP EST MAY X REQ PHY/QHP: CPT | Mod: 25

## 2017-12-26 PROCEDURE — 59025 FETAL NON-STRESS TEST: CPT

## 2017-12-26 PROCEDURE — 99213 OFFICE O/P EST LOW 20 MIN: CPT | Mod: TH,25,, | Performed by: ADVANCED PRACTICE MIDWIFE

## 2017-12-26 PROCEDURE — 59025 FETAL NON-STRESS TEST: CPT | Mod: 26,,, | Performed by: ADVANCED PRACTICE MIDWIFE

## 2017-12-26 RX ORDER — BUTALBITAL, ACETAMINOPHEN AND CAFFEINE 50; 325; 40 MG/1; MG/1; MG/1
2 TABLET ORAL EVERY 6 HOURS PRN
Status: DISCONTINUED | OUTPATIENT
Start: 2017-12-26 | End: 2017-12-27 | Stop reason: HOSPADM

## 2017-12-26 RX ORDER — ACETAMINOPHEN 500 MG
500 TABLET ORAL EVERY 6 HOURS PRN
Status: DISCONTINUED | OUTPATIENT
Start: 2017-12-26 | End: 2017-12-27 | Stop reason: HOSPADM

## 2017-12-26 RX ORDER — ONDANSETRON 8 MG/1
8 TABLET, ORALLY DISINTEGRATING ORAL EVERY 8 HOURS PRN
Status: DISCONTINUED | OUTPATIENT
Start: 2017-12-26 | End: 2017-12-27 | Stop reason: HOSPADM

## 2017-12-26 RX ADMIN — BUTALBITAL, ACETAMINOPHEN, AND CAFFEINE 2 TABLET: 50; 325; 40 TABLET ORAL at 09:12

## 2017-12-27 NOTE — DISCHARGE SUMMARY
[facility]  Obstetrics  Discharge Summary      Patient Name: Sybil Bal  MRN: [id number]  Admission Date: 2017  Hospital Length of Stay: 0 days  Discharge Date and Time:  2017 11:10 PM  Attending Physician: Rossi Zarate MD   Discharging Provider: Emiliana Godinez CNM  Primary Care Provider: Clary Monroe MD    HPI:  IUP at 37 weeks presents with migraine    * No surgery found *     Hospital Course:   Medicated with fioricet  hydrated        Final Active Diagnoses:    Diagnosis Date Noted POA    PRINCIPAL PROBLEM:  Migraine headache [G43.909] 2017 Yes      Problems Resolved During this Admission:    Diagnosis Date Noted Date Resolved POA            Feeding Method: breast    Immunizations     None          This patient has no babies on file.  Pending Diagnostic Studies:     None          Discharged Condition: good    Disposition: Home or Self Care    Follow Up:  Follow-up Information     Follow up In 1 week.               Patient Instructions:     Activity as tolerated       Medications:  Current Discharge Medication List      CONTINUE these medications which have NOT CHANGED    Details   hydrOXYzine pamoate (VISTARIL) 25 MG Cap Take 1 capsule (25 mg total) by mouth 4 (four) times daily.  Qty: 30 capsule, Refills: 2      PRENATAL VIT,ANNA 74/IRON/FOLIC (PRENATAL VITAMIN 1+1 ORAL) Take by mouth.      ranitidine (ZANTAC) 150 MG tablet Take 1 tablet (150 mg total) by mouth 2 (two) times daily.  Qty: 60 tablet, Refills: 3      valACYclovir (VALTREX) 500 MG tablet Take 1 tablet (500 mg total) by mouth once daily.  Qty: 30 tablet, Refills: 1      ziprasidone (GEODON) 80 MG capsule Take 80 mg by mouth once daily.             Emiliana Godinez CNM  Obstetrics  [facility]

## 2017-12-27 NOTE — SUBJECTIVE & OBJECTIVE
Obstetric HPI:  Patient reports None contractions, active fetal movement, No vaginal bleeding , No loss of fluid     This pregnancy has been complicated by bipolar disorder, smoking,HSV2,obesity, anemia    Obstetric History       T3      L3     SAB0   TAB0   Ectopic0   Multiple0   Live Births3       # Outcome Date GA Lbr Rene/2nd Weight Sex Delivery Anes PTL Lv   4 Current            3 Term 16 37w6d / 00:03 3.195 kg (7 lb 0.7 oz) M Vag-Spont EPI N JAVI      Apgar1:  8                Apgar5: 8   2 Term 10/03/14   3.09 kg (6 lb 13 oz)  Vag-Spont   JAVI   1 Term 12   3.544 kg (7 lb 13 oz) F Vag-Spont   JAVI        Past Medical History:   Diagnosis Date    Anemia     Anxiety     Bipolar 1 disorder     Morbid obesity     pt weighed 400# prior to gastric sleeve surgery    Tobacco use disorder      Past Surgical History:   Procedure Laterality Date    ABDOMINAL SURGERY      gastric sleeve      TONSILLECTOMY         PTA Medications   Medication Sig    hydrOXYzine pamoate (VISTARIL) 25 MG Cap Take 1 capsule (25 mg total) by mouth 4 (four) times daily.    PRENATAL VIT,ANNA 74/IRON/FOLIC (PRENATAL VITAMIN 1+1 ORAL) Take by mouth.    ranitidine (ZANTAC) 150 MG tablet Take 1 tablet (150 mg total) by mouth 2 (two) times daily.    valACYclovir (VALTREX) 500 MG tablet Take 1 tablet (500 mg total) by mouth once daily.    ziprasidone (GEODON) 80 MG capsule Take 80 mg by mouth once daily.       Review of patient's allergies indicates:  No Known Allergies     Family History     None        Social History Main Topics    Smoking status: Heavy Tobacco Smoker     Packs/day: 1.00     Types: Cigarettes     Last attempt to quit: 2/10/2014    Smokeless tobacco: Never Used    Alcohol use No    Drug use: No    Sexual activity: Yes     Partners: Male     Birth control/ protection: None     Review of Systems   Constitutional: Negative.    HENT: Negative.    Eyes: Negative.    Respiratory: Negative.     Cardiovascular: Negative.    Gastrointestinal: Negative.    Endocrine: Negative.    Genitourinary: Negative.    Musculoskeletal: Negative.    Skin:  Negative.   Neurological: Positive for headaches.   Hematological: Negative.    Psychiatric/Behavioral: Negative.    Breast: negative.    All other systems reviewed and are negative.     Objective:     Vital Signs (Most Recent):    Vital Signs (24h Range):           There is no height or weight on file to calculate BMI.    FHT: 144Cat 1 (reassuring)  TOCO:  Q 30 minutes    Physical Exam    Cervix:  Dilation:    Effacement:    Station:   Presentation:      Significant Labs:  Lab Results   Component Value Date    GROUPTRH O POS 09/13/2017    HEPBSAG Negative 09/13/2017    STREPBCULT No Group B Streptococcus isolated 12/11/2017       I have personallly reviewed all pertinent lab results from the last 24 hours.

## 2017-12-27 NOTE — H&P
[facility]   Obstetrics  History & Physical    Patient Name: Sybil Bal  MRN: [id number]  Admission Date: 2017  Primary Care Provider: Clary Monroe MD    Subjective:     Principal Problem:<principal problem not specified>    History of Present Illness:   IUP at 37 weeks presents with migraine    Obstetric HPI:  Patient reports None contractions, active fetal movement, No vaginal bleeding , No loss of fluid     This pregnancy has been complicated by bipolar disorder, smoking,HSV2,obesity, anemia    Obstetric History       T3      L3     SAB0   TAB0   Ectopic0   Multiple0   Live Births3       # Outcome Date GA Lbr Rene/2nd Weight Sex Delivery Anes PTL Lv   4 Current            3 Term 16 37w6d / 00:03 3.195 kg (7 lb 0.7 oz) M Vag-Spont EPI N JAVI      Apgar1:  8                Apgar5: 8   2 Term 10/03/14   3.09 kg (6 lb 13 oz)  Vag-Spont   JAVI   1 Term 12   3.544 kg (7 lb 13 oz) F Vag-Spont   JAVI        Past Medical History:   Diagnosis Date    Anemia     Anxiety     Bipolar 1 disorder     Morbid obesity     pt weighed 400# prior to gastric sleeve surgery    Tobacco use disorder      Past Surgical History:   Procedure Laterality Date    ABDOMINAL SURGERY      gastric sleeve      TONSILLECTOMY         PTA Medications   Medication Sig    hydrOXYzine pamoate (VISTARIL) 25 MG Cap Take 1 capsule (25 mg total) by mouth 4 (four) times daily.    PRENATAL VIT,ANNA 74/IRON/FOLIC (PRENATAL VITAMIN 1+1 ORAL) Take by mouth.    ranitidine (ZANTAC) 150 MG tablet Take 1 tablet (150 mg total) by mouth 2 (two) times daily.    valACYclovir (VALTREX) 500 MG tablet Take 1 tablet (500 mg total) by mouth once daily.    ziprasidone (GEODON) 80 MG capsule Take 80 mg by mouth once daily.       Review of patient's allergies indicates:  No Known Allergies     Family History     None        Social History Main Topics    Smoking status: Heavy Tobacco Smoker     Packs/day: 1.00      Types: Cigarettes     Last attempt to quit: 2/10/2014    Smokeless tobacco: Never Used    Alcohol use No    Drug use: No    Sexual activity: Yes     Partners: Male     Birth control/ protection: None     Review of Systems   Constitutional: Negative.    HENT: Negative.    Eyes: Negative.    Respiratory: Negative.    Cardiovascular: Negative.    Gastrointestinal: Negative.    Endocrine: Negative.    Genitourinary: Negative.    Musculoskeletal: Negative.    Skin:  Negative.   Neurological: Positive for headaches.   Hematological: Negative.    Psychiatric/Behavioral: Negative.    Breast: negative.    All other systems reviewed and are negative.     Objective:     Vital Signs (Most Recent):    Vital Signs (24h Range):           There is no height or weight on file to calculate BMI.    FHT: 144Cat 1 (reassuring)  TOCO:  Q 30 minutes    Physical Exam    Cervix:  Dilation:    Effacement:    Station:   Presentation:      Significant Labs:  Lab Results   Component Value Date    GROUPTRH O POS 2017    HEPBSAG Negative 2017    STREPBCULT No Group B Streptococcus isolated 2017       I have personallly reviewed all pertinent lab results from the last 24 hours.    Assessment/Plan:     22 y.o. female  at 37w0d for:    Migraine headache    IUP at 37 weeks  Migraine headache  P medicate with fioricet  hydrate            Emiliana Godinez CNM  Obstetrics  [facility]

## 2017-12-27 NOTE — PROCEDURES
Sybil Bal is a 22 y.o. female patient.            Obtain Fetal nonstress test (NST)  Date/Time: 12/26/2017 10:35 PM  Performed by: EMILIANA DE SANTIAGO  Authorized by: EMILIANA DE SANTIAGO     Nonstress Test:     Variability:  6-25 BPM    Decelerations:  None    Accelerations:  15 bpm    Acoustic Stimulator: No      Baseline:  140    Uterine Irritability: No      Contractions:  Not present  Biophysical Profile:     Nonstress Test Interpretation: reactive      Overall Impression:  Reassuring  Post-procedure:     Patient tolerance:  Patient tolerated the procedure well with no immediate complications        Emiliana De Santiago  12/26/2017

## 2017-12-28 ENCOUNTER — ROUTINE PRENATAL (OUTPATIENT)
Dept: OBSTETRICS AND GYNECOLOGY | Facility: CLINIC | Age: 22
End: 2017-12-28
Payer: MEDICAID

## 2017-12-28 VITALS
BODY MASS INDEX: 49.17 KG/M2 | SYSTOLIC BLOOD PRESSURE: 124 MMHG | DIASTOLIC BLOOD PRESSURE: 80 MMHG | WEIGHT: 251.75 LBS

## 2017-12-28 DIAGNOSIS — O99.212 OBESITY AFFECTING PREGNANCY IN SECOND TRIMESTER: Primary | ICD-10-CM

## 2017-12-28 DIAGNOSIS — Z34.83 SUPERVISION OF NORMAL INTRAUTERINE PREGNANCY IN MULTIGRAVIDA IN THIRD TRIMESTER: ICD-10-CM

## 2017-12-28 PROCEDURE — 99212 OFFICE O/P EST SF 10 MIN: CPT | Mod: PBBFAC | Performed by: ADVANCED PRACTICE MIDWIFE

## 2017-12-28 PROCEDURE — 99213 OFFICE O/P EST LOW 20 MIN: CPT | Mod: TH,S$PBB,, | Performed by: ADVANCED PRACTICE MIDWIFE

## 2017-12-28 PROCEDURE — 99999 PR PBB SHADOW E&M-EST. PATIENT-LVL II: CPT | Mod: PBBFAC,,, | Performed by: ADVANCED PRACTICE MIDWIFE

## 2017-12-28 NOTE — PROGRESS NOTES
Doing well  C/o numbness in L hand, discussed eating less sodium, drinking plenty of water with lemon, elevating hands on pillows, could be related to carpal tunnel- advised to get brace   Growth scan US next visit

## 2018-01-02 ENCOUNTER — PROCEDURE VISIT (OUTPATIENT)
Dept: OBSTETRICS AND GYNECOLOGY | Facility: CLINIC | Age: 23
End: 2018-01-02
Payer: MEDICAID

## 2018-01-02 ENCOUNTER — ROUTINE PRENATAL (OUTPATIENT)
Dept: OBSTETRICS AND GYNECOLOGY | Facility: CLINIC | Age: 23
End: 2018-01-02
Payer: MEDICAID

## 2018-01-02 VITALS
SYSTOLIC BLOOD PRESSURE: 130 MMHG | WEIGHT: 250.69 LBS | BODY MASS INDEX: 48.95 KG/M2 | DIASTOLIC BLOOD PRESSURE: 80 MMHG

## 2018-01-02 DIAGNOSIS — Z34.83 ENCOUNTER FOR SUPERVISION OF OTHER NORMAL PREGNANCY IN THIRD TRIMESTER: Primary | ICD-10-CM

## 2018-01-02 DIAGNOSIS — O99.212 OBESITY AFFECTING PREGNANCY IN SECOND TRIMESTER: ICD-10-CM

## 2018-01-02 DIAGNOSIS — O99.213 OBESITY AFFECTING PREGNANCY IN THIRD TRIMESTER: ICD-10-CM

## 2018-01-02 PROCEDURE — 99213 OFFICE O/P EST LOW 20 MIN: CPT | Mod: TH,S$PBB,, | Performed by: ADVANCED PRACTICE MIDWIFE

## 2018-01-02 PROCEDURE — 76816 OB US FOLLOW-UP PER FETUS: CPT | Mod: PBBFAC,PO | Performed by: OBSTETRICS & GYNECOLOGY

## 2018-01-02 PROCEDURE — 76816 OB US FOLLOW-UP PER FETUS: CPT | Mod: 26,S$PBB,, | Performed by: OBSTETRICS & GYNECOLOGY

## 2018-01-02 PROCEDURE — 76819 FETAL BIOPHYS PROFIL W/O NST: CPT | Mod: 26,S$PBB,, | Performed by: OBSTETRICS & GYNECOLOGY

## 2018-01-02 PROCEDURE — 76819 FETAL BIOPHYS PROFIL W/O NST: CPT | Mod: PBBFAC,PO | Performed by: OBSTETRICS & GYNECOLOGY

## 2018-01-02 PROCEDURE — 99212 OFFICE O/P EST SF 10 MIN: CPT | Mod: PBBFAC,PO | Performed by: ADVANCED PRACTICE MIDWIFE

## 2018-01-02 PROCEDURE — 99999 PR PBB SHADOW E&M-EST. PATIENT-LVL II: CPT | Mod: PBBFAC,,, | Performed by: ADVANCED PRACTICE MIDWIFE

## 2018-01-02 NOTE — PROGRESS NOTES
States she has been having uc's since last night.  Currently 6-10 mins apart. Recommend if uc's continue and get closer she needs to go to the hospital.   PIH precautions provided  BPP 8/8

## 2018-01-06 ENCOUNTER — HOSPITAL ENCOUNTER (OUTPATIENT)
Facility: HOSPITAL | Age: 23
Discharge: HOME | End: 2018-01-06
Attending: OBSTETRICS & GYNECOLOGY | Admitting: OBSTETRICS & GYNECOLOGY
Payer: MEDICAID

## 2018-01-06 VITALS
SYSTOLIC BLOOD PRESSURE: 121 MMHG | DIASTOLIC BLOOD PRESSURE: 54 MMHG | RESPIRATION RATE: 18 BRPM | HEART RATE: 72 BPM | TEMPERATURE: 98 F

## 2018-01-06 DIAGNOSIS — O26.893 PREGNANCY HEADACHE IN THIRD TRIMESTER: ICD-10-CM

## 2018-01-06 DIAGNOSIS — R51.9 PREGNANCY HEADACHE IN THIRD TRIMESTER: ICD-10-CM

## 2018-01-06 PROBLEM — G43.909 MIGRAINE HEADACHE: Status: RESOLVED | Noted: 2017-12-26 | Resolved: 2018-01-06

## 2018-01-06 PROBLEM — O26.899 HEADACHE IN PREGNANCY: Status: ACTIVE | Noted: 2018-01-06

## 2018-01-06 PROBLEM — O26.899 HEADACHE IN PREGNANCY: Status: RESOLVED | Noted: 2018-01-06 | Resolved: 2018-01-06

## 2018-01-06 LAB
ALBUMIN SERPL BCP-MCNC: 2.3 G/DL
ALP SERPL-CCNC: 222 U/L
ALT SERPL W/O P-5'-P-CCNC: 6 U/L
ANION GAP SERPL CALC-SCNC: 10 MMOL/L
AST SERPL-CCNC: 15 U/L
BASOPHILS # BLD AUTO: 0.03 K/UL
BASOPHILS NFR BLD: 0.2 %
BILIRUB SERPL-MCNC: 0.2 MG/DL
BUN SERPL-MCNC: 7 MG/DL
CALCIUM SERPL-MCNC: 8.7 MG/DL
CHLORIDE SERPL-SCNC: 105 MMOL/L
CO2 SERPL-SCNC: 20 MMOL/L
CREAT SERPL-MCNC: 0.6 MG/DL
CREAT UR-MCNC: 119.9 MG/DL
DIFFERENTIAL METHOD: ABNORMAL
EOSINOPHIL # BLD AUTO: 0.3 K/UL
EOSINOPHIL NFR BLD: 1.6 %
ERYTHROCYTE [DISTWIDTH] IN BLOOD BY AUTOMATED COUNT: 14.3 %
EST. GFR  (AFRICAN AMERICAN): >60 ML/MIN/1.73 M^2
EST. GFR  (NON AFRICAN AMERICAN): >60 ML/MIN/1.73 M^2
GLUCOSE SERPL-MCNC: 127 MG/DL
HCT VFR BLD AUTO: 29.8 %
HGB BLD-MCNC: 9.5 G/DL
LYMPHOCYTES # BLD AUTO: 3.4 K/UL
LYMPHOCYTES NFR BLD: 21.1 %
MCH RBC QN AUTO: 25.6 PG
MCHC RBC AUTO-ENTMCNC: 31.9 G/DL
MCV RBC AUTO: 80 FL
MONOCYTES # BLD AUTO: 0.8 K/UL
MONOCYTES NFR BLD: 4.9 %
NEUTROPHILS # BLD AUTO: 11.8 K/UL
NEUTROPHILS NFR BLD: 72.2 %
PLATELET # BLD AUTO: 390 K/UL
PMV BLD AUTO: 9.8 FL
POTASSIUM SERPL-SCNC: 3.3 MMOL/L
PROT SERPL-MCNC: 6.4 G/DL
PROT UR-MCNC: 20 MG/DL
PROT/CREAT RATIO, UR: 0.17
RBC # BLD AUTO: 3.71 M/UL
SODIUM SERPL-SCNC: 135 MMOL/L
WBC # BLD AUTO: 16.31 K/UL

## 2018-01-06 PROCEDURE — G0378 HOSPITAL OBSERVATION PER HR: HCPCS

## 2018-01-06 PROCEDURE — 99218 PR INITIAL OBSERVATION CARE,LEVL I: CPT | Mod: ,,, | Performed by: OBSTETRICS & GYNECOLOGY

## 2018-01-06 PROCEDURE — 96374 THER/PROPH/DIAG INJ IV PUSH: CPT

## 2018-01-06 PROCEDURE — 85025 COMPLETE CBC W/AUTO DIFF WBC: CPT

## 2018-01-06 PROCEDURE — 63600175 PHARM REV CODE 636 W HCPCS: Performed by: ADVANCED PRACTICE MIDWIFE

## 2018-01-06 PROCEDURE — 59025 FETAL NON-STRESS TEST: CPT

## 2018-01-06 PROCEDURE — 99211 OFF/OP EST MAY X REQ PHY/QHP: CPT

## 2018-01-06 PROCEDURE — 84156 ASSAY OF PROTEIN URINE: CPT

## 2018-01-06 PROCEDURE — 80053 COMPREHEN METABOLIC PANEL: CPT

## 2018-01-06 PROCEDURE — 25000003 PHARM REV CODE 250: Performed by: ADVANCED PRACTICE MIDWIFE

## 2018-01-06 PROCEDURE — 63600175 PHARM REV CODE 636 W HCPCS: Performed by: OBSTETRICS & GYNECOLOGY

## 2018-01-06 RX ORDER — BUTORPHANOL TARTRATE 1 MG/ML
1 INJECTION INTRAMUSCULAR; INTRAVENOUS ONCE
Status: COMPLETED | OUTPATIENT
Start: 2018-01-06 | End: 2018-01-06

## 2018-01-06 RX ORDER — ACETAMINOPHEN 500 MG
500 TABLET ORAL EVERY 6 HOURS PRN
Status: DISCONTINUED | OUTPATIENT
Start: 2018-01-06 | End: 2018-01-06 | Stop reason: HOSPADM

## 2018-01-06 RX ORDER — ONDANSETRON 8 MG/1
8 TABLET, ORALLY DISINTEGRATING ORAL EVERY 8 HOURS PRN
Status: DISCONTINUED | OUTPATIENT
Start: 2018-01-06 | End: 2018-01-06 | Stop reason: HOSPADM

## 2018-01-06 RX ORDER — BUTALBITAL, ACETAMINOPHEN AND CAFFEINE 50; 325; 40 MG/1; MG/1; MG/1
1 TABLET ORAL EVERY 4 HOURS PRN
Status: DISCONTINUED | OUTPATIENT
Start: 2018-01-06 | End: 2018-01-06 | Stop reason: HOSPADM

## 2018-01-06 RX ORDER — BUTORPHANOL TARTRATE 1 MG/ML
2 INJECTION INTRAMUSCULAR; INTRAVENOUS ONCE
Status: COMPLETED | OUTPATIENT
Start: 2018-01-06 | End: 2018-01-06

## 2018-01-06 RX ADMIN — BUTORPHANOL TARTRATE 1 MG: 1 INJECTION, SOLUTION INTRAMUSCULAR; INTRAVENOUS at 07:01

## 2018-01-06 RX ADMIN — BUTORPHANOL TARTRATE 2 MG: 1 INJECTION, SOLUTION INTRAMUSCULAR; INTRAVENOUS at 03:01

## 2018-01-06 RX ADMIN — BUTALBITAL, ACETAMINOPHEN, AND CAFFEINE 1 TABLET: 50; 325; 40 TABLET ORAL at 02:01

## 2018-01-06 NOTE — SUBJECTIVE & OBJECTIVE
Obstetric HPI:  Patient here for complaint of headache. Denies visual disturbances or epigastric pain. Reports active fetal movement, No vaginal bleeding , No loss of fluid     This pregnancy has been complicated by smoker, HSV, obesity    Obstetric History       T3      L3     SAB0   TAB0   Ectopic0   Multiple0   Live Births3       # Outcome Date GA Lbr Rene/2nd Weight Sex Delivery Anes PTL Lv   4 Current            3 Term 16 37w6d / 00:03 3.195 kg (7 lb 0.7 oz) M Vag-Spont EPI N JAVI      Apgar1:  8                Apgar5: 8   2 Term 10/03/14   3.09 kg (6 lb 13 oz)  Vag-Spont   JAVI   1 Term 12   3.544 kg (7 lb 13 oz) F Vag-Spont   JAVI        Past Medical History:   Diagnosis Date    Anemia     Anxiety     Bipolar 1 disorder     Morbid obesity     pt weighed 400# prior to gastric sleeve surgery    Tobacco use disorder      Past Surgical History:   Procedure Laterality Date    ABDOMINAL SURGERY      gastric sleeve      TONSILLECTOMY         PTA Medications   Medication Sig    hydrOXYzine pamoate (VISTARIL) 25 MG Cap Take 1 capsule (25 mg total) by mouth 4 (four) times daily.    PRENATAL VIT,ANNA 74/IRON/FOLIC (PRENATAL VITAMIN 1+1 ORAL) Take by mouth.    ranitidine (ZANTAC) 150 MG tablet Take 1 tablet (150 mg total) by mouth 2 (two) times daily.    valACYclovir (VALTREX) 500 MG tablet Take 1 tablet (500 mg total) by mouth once daily.    ziprasidone (GEODON) 80 MG capsule Take 80 mg by mouth once daily.       Review of patient's allergies indicates:  No Known Allergies     Family History     None        Social History Main Topics    Smoking status: Heavy Tobacco Smoker     Packs/day: 1.00     Types: Cigarettes     Last attempt to quit: 2/10/2014    Smokeless tobacco: Never Used    Alcohol use No    Drug use: No    Sexual activity: Yes     Partners: Male     Birth control/ protection: None     Review of Systems   Objective:     Vital Signs (Most Recent):  Temp: 98.3 °F (36.8 °C)  (01/06/18 0210)  Pulse: 66 (01/06/18 0530)  BP: 133/61 (01/06/18 0530) Vital Signs (24h Range):  Temp:  [98.3 °F (36.8 °C)] 98.3 °F (36.8 °C)  Pulse:  [66-98] 66  BP: (109-158)/(55-83) 133/61        There is no height or weight on file to calculate BMI.    FHT: 140's Cat 1 (reassuring)  TOCO: irregular     Physical Exam    Cervix:  Dilation:  deferred     Significant Labs:  Lab Results   Component Value Date    GROUPTRH O POS 09/13/2017    HEPBSAG Negative 09/13/2017    STREPBCULT No Group B Streptococcus isolated 12/11/2017       I have personallly reviewed all pertinent lab results from the last 24 hours.

## 2018-01-06 NOTE — HPI
22 year old  presents to labor and delivery with complaint of headache. Denies visual disturbances, epigastric pain.

## 2018-01-06 NOTE — NURSING
Gave patient AVS and educated on  discharge information. Educated on nutrition, hydration, home medications, fetal kick counts, activity, s/s of OB emergencies, and reasons to notify OB provider (including vaginal bleeding like a period, rupture of membranes, constant and strong abdominal pain or tenderness that does not go away, contractions every 3-5 min for 1-2 hours that are increasing in strength, changes in urination such as hematuria/oliguria/dysuria/urgency/frequency, temp greater than 100.4 F). Pre-e precautions. Patient verbalized understanding. Ambulating out of triage with mother. Mother will be driving patient home. Discharged.

## 2018-01-06 NOTE — H&P
[facility]   Obstetrics  History & Physical    Patient Name: Sybil Bal  MRN: [id number]  Admission Date: 2018  Primary Care Provider: Clary Monroe MD    Subjective:     Principal Problem:Pregnancy headache in third trimester    History of Present Illness:  22 year old  presents to labor and delivery with complaint of headache. Denies visual disturbances, epigastric pain.     Obstetric HPI:  Patient here for complaint of headache. Denies visual disturbances or epigastric pain. Reports active fetal movement, No vaginal bleeding , No loss of fluid     This pregnancy has been complicated by smoker, HSV, obesity    Obstetric History       T3      L3     SAB0   TAB0   Ectopic0   Multiple0   Live Births3       # Outcome Date GA Lbr Rene/2nd Weight Sex Delivery Anes PTL Lv   4 Current            3 Term 16 37w6d / 00:03 3.195 kg (7 lb 0.7 oz) M Vag-Spont EPI N JAVI      Apgar1:  8                Apgar5: 8   2 Term 10/03/14   3.09 kg (6 lb 13 oz)  Vag-Spont   JAVI   1 Term 12   3.544 kg (7 lb 13 oz) F Vag-Spont   JAVI        Past Medical History:   Diagnosis Date    Anemia     Anxiety     Bipolar 1 disorder     Morbid obesity     pt weighed 400# prior to gastric sleeve surgery    Tobacco use disorder      Past Surgical History:   Procedure Laterality Date    ABDOMINAL SURGERY      gastric sleeve      TONSILLECTOMY         PTA Medications   Medication Sig    hydrOXYzine pamoate (VISTARIL) 25 MG Cap Take 1 capsule (25 mg total) by mouth 4 (four) times daily.    PRENATAL VIT,ANNA 74/IRON/FOLIC (PRENATAL VITAMIN 1+1 ORAL) Take by mouth.    ranitidine (ZANTAC) 150 MG tablet Take 1 tablet (150 mg total) by mouth 2 (two) times daily.    valACYclovir (VALTREX) 500 MG tablet Take 1 tablet (500 mg total) by mouth once daily.    ziprasidone (GEODON) 80 MG capsule Take 80 mg by mouth once daily.       Review of patient's allergies indicates:  No Known Allergies      Family History     None        Social History Main Topics    Smoking status: Heavy Tobacco Smoker     Packs/day: 1.00     Types: Cigarettes     Last attempt to quit: 2/10/2014    Smokeless tobacco: Never Used    Alcohol use No    Drug use: No    Sexual activity: Yes     Partners: Male     Birth control/ protection: None     Review of Systems   Objective:     Vital Signs (Most Recent):  Temp: 98.3 °F (36.8 °C) (18 0210)  Pulse: 66 (18 0530)  BP: 133/61 (18 0530) Vital Signs (24h Range):  Temp:  [98.3 °F (36.8 °C)] 98.3 °F (36.8 °C)  Pulse:  [66-98] 66  BP: (109-158)/(55-83) 133/61        There is no height or weight on file to calculate BMI.    FHT: 140's Cat 1 (reassuring)  TOCO: irregular     Physical Exam    Cervix:  Dilation:  deferred     Significant Labs:  Lab Results   Component Value Date    GROUPTRH O POS 2017    HEPBSAG Negative 2017    STREPBCULT No Group B Streptococcus isolated 2017       I have personallly reviewed all pertinent lab results from the last 24 hours.    Assessment/Plan:     22 y.o. female  at 38w4d for:    * Pregnancy headache in third trimester    Observe  PIH work up  PCR  EF[facility]/[place]            Maryam Leary CNM  Obstetrics  [facility]

## 2018-01-06 NOTE — PROCEDURES
NST    1/6/2018  38w4d  Indication for Test:  Admitted for headache with elevated BP  Nonstress Test  Variability: Moderate  Decelerations: None  Accelerations: Yes  Acoustic Stimulator: No  Baseline: 115 BPM  Uterine Irritability: No  Contractions: Not present  Interpretation  Nonstress Test Interpretation: Reactive  Overall Impression: Reassuring

## 2018-01-06 NOTE — NURSING
"Patient lying down in bed talking on phone. Patient put phone down and stated to RN, "My headache is gone now."  "

## 2018-01-06 NOTE — PROGRESS NOTES
PIH work up wnl  Pt given stadol for relief. Rested and felt better. She wanted to go home the upom getting up said her head was pounding again.   Will consult Dr Echols

## 2018-01-09 ENCOUNTER — ROUTINE PRENATAL (OUTPATIENT)
Dept: OBSTETRICS AND GYNECOLOGY | Facility: CLINIC | Age: 23
End: 2018-01-09
Payer: MEDICAID

## 2018-01-09 ENCOUNTER — PROCEDURE VISIT (OUTPATIENT)
Dept: OBSTETRICS AND GYNECOLOGY | Facility: CLINIC | Age: 23
End: 2018-01-09
Payer: MEDICAID

## 2018-01-09 VITALS
BODY MASS INDEX: 49.34 KG/M2 | SYSTOLIC BLOOD PRESSURE: 124 MMHG | WEIGHT: 252.63 LBS | DIASTOLIC BLOOD PRESSURE: 80 MMHG

## 2018-01-09 DIAGNOSIS — O99.213 OBESITY AFFECTING PREGNANCY IN THIRD TRIMESTER: Primary | ICD-10-CM

## 2018-01-09 DIAGNOSIS — O40.3XX0 POLYHYDRAMNIOS AFFECTING PREGNANCY IN THIRD TRIMESTER: ICD-10-CM

## 2018-01-09 DIAGNOSIS — O99.213 OBESITY AFFECTING PREGNANCY IN THIRD TRIMESTER: ICD-10-CM

## 2018-01-09 PROBLEM — R51.9 PREGNANCY HEADACHE IN THIRD TRIMESTER: Status: RESOLVED | Noted: 2018-01-06 | Resolved: 2018-01-09

## 2018-01-09 PROBLEM — O26.893 PREGNANCY HEADACHE IN THIRD TRIMESTER: Status: RESOLVED | Noted: 2018-01-06 | Resolved: 2018-01-09

## 2018-01-09 PROCEDURE — 99999 PR PBB SHADOW E&M-EST. PATIENT-LVL II: CPT | Mod: PBBFAC,,, | Performed by: ADVANCED PRACTICE MIDWIFE

## 2018-01-09 PROCEDURE — 99213 OFFICE O/P EST LOW 20 MIN: CPT | Mod: TH,S$PBB,, | Performed by: ADVANCED PRACTICE MIDWIFE

## 2018-01-09 PROCEDURE — 99212 OFFICE O/P EST SF 10 MIN: CPT | Mod: PBBFAC,PO,25 | Performed by: ADVANCED PRACTICE MIDWIFE

## 2018-01-09 PROCEDURE — 76819 FETAL BIOPHYS PROFIL W/O NST: CPT | Mod: 26,S$PBB,, | Performed by: OBSTETRICS & GYNECOLOGY

## 2018-01-09 PROCEDURE — 76819 FETAL BIOPHYS PROFIL W/O NST: CPT | Mod: PBBFAC,PO | Performed by: OBSTETRICS & GYNECOLOGY

## 2018-01-15 ENCOUNTER — TELEPHONE (OUTPATIENT)
Dept: OBSTETRICS AND GYNECOLOGY | Facility: HOSPITAL | Age: 23
End: 2018-01-15

## 2018-01-15 ENCOUNTER — PROCEDURE VISIT (OUTPATIENT)
Dept: OBSTETRICS AND GYNECOLOGY | Facility: CLINIC | Age: 23
End: 2018-01-15
Payer: MEDICAID

## 2018-01-15 ENCOUNTER — ROUTINE PRENATAL (OUTPATIENT)
Dept: OBSTETRICS AND GYNECOLOGY | Facility: CLINIC | Age: 23
End: 2018-01-15
Payer: MEDICAID

## 2018-01-15 VITALS
BODY MASS INDEX: 50.46 KG/M2 | SYSTOLIC BLOOD PRESSURE: 137 MMHG | WEIGHT: 258.38 LBS | DIASTOLIC BLOOD PRESSURE: 82 MMHG

## 2018-01-15 DIAGNOSIS — Z34.83 ENCOUNTER FOR SUPERVISION OF OTHER NORMAL PREGNANCY IN THIRD TRIMESTER: Primary | ICD-10-CM

## 2018-01-15 DIAGNOSIS — O99.213 OBESITY AFFECTING PREGNANCY IN THIRD TRIMESTER: ICD-10-CM

## 2018-01-15 DIAGNOSIS — O26.893 PREGNANCY HEADACHE IN THIRD TRIMESTER: Primary | ICD-10-CM

## 2018-01-15 DIAGNOSIS — R51.9 PREGNANCY HEADACHE IN THIRD TRIMESTER: Primary | ICD-10-CM

## 2018-01-15 PROCEDURE — 99999 PR PBB SHADOW E&M-EST. PATIENT-LVL II: CPT | Mod: PBBFAC,,, | Performed by: ADVANCED PRACTICE MIDWIFE

## 2018-01-15 PROCEDURE — 76819 FETAL BIOPHYS PROFIL W/O NST: CPT | Mod: PBBFAC,PO | Performed by: OBSTETRICS & GYNECOLOGY

## 2018-01-15 PROCEDURE — 76819 FETAL BIOPHYS PROFIL W/O NST: CPT | Mod: 26,S$PBB,, | Performed by: OBSTETRICS & GYNECOLOGY

## 2018-01-15 PROCEDURE — 99212 OFFICE O/P EST SF 10 MIN: CPT | Mod: PBBFAC,TH,PO,25 | Performed by: ADVANCED PRACTICE MIDWIFE

## 2018-01-15 PROCEDURE — 99213 OFFICE O/P EST LOW 20 MIN: CPT | Mod: TH,S$PBB,, | Performed by: ADVANCED PRACTICE MIDWIFE

## 2018-01-15 RX ORDER — BUTALBITAL, ACETAMINOPHEN AND CAFFEINE 50; 325; 40 MG/1; MG/1; MG/1
2 TABLET ORAL EVERY 6 HOURS PRN
Qty: 20 TABLET | Refills: 1 | Status: SHIPPED | OUTPATIENT
Start: 2018-01-15 | End: 2018-03-20

## 2018-01-19 ENCOUNTER — TELEPHONE (OUTPATIENT)
Dept: OBSTETRICS AND GYNECOLOGY | Facility: CLINIC | Age: 23
End: 2018-01-19

## 2018-01-19 NOTE — TELEPHONE ENCOUNTER
----- Message from Margot Goldstein sent at 1/19/2018  8:03 AM CST -----  Contact: SELF   Patient would like to consult with nurse regarding possibly being induced. Please call back at [phone].      Thanks,  Margot Goldstein

## 2018-01-22 RX ORDER — HYDROXYZINE PAMOATE 25 MG/1
CAPSULE ORAL
Qty: 30 CAPSULE | Refills: 2 | Status: SHIPPED | OUTPATIENT
Start: 2018-01-22

## 2018-01-23 ENCOUNTER — ROUTINE PRENATAL (OUTPATIENT)
Dept: OBSTETRICS AND GYNECOLOGY | Facility: CLINIC | Age: 23
End: 2018-01-23
Payer: MEDICAID

## 2018-01-23 VITALS
BODY MASS INDEX: 51.24 KG/M2 | WEIGHT: 262.38 LBS | SYSTOLIC BLOOD PRESSURE: 132 MMHG | DIASTOLIC BLOOD PRESSURE: 72 MMHG

## 2018-01-23 DIAGNOSIS — O99.213 OBESITY AFFECTING PREGNANCY IN THIRD TRIMESTER: Primary | ICD-10-CM

## 2018-01-23 DIAGNOSIS — O48.0 41 WEEKS GESTATION OF PREGNANCY: ICD-10-CM

## 2018-01-23 DIAGNOSIS — Z3A.41 41 WEEKS GESTATION OF PREGNANCY: ICD-10-CM

## 2018-01-23 PROCEDURE — 99212 OFFICE O/P EST SF 10 MIN: CPT | Mod: PBBFAC,TH | Performed by: MIDWIFE

## 2018-01-23 PROCEDURE — 99212 OFFICE O/P EST SF 10 MIN: CPT | Mod: TH,S$PBB,, | Performed by: MIDWIFE

## 2018-01-23 PROCEDURE — 99999 PR PBB SHADOW E&M-EST. PATIENT-LVL II: CPT | Mod: PBBFAC,,, | Performed by: MIDWIFE

## 2018-01-23 RX ORDER — ONDANSETRON 4 MG/1
8 TABLET, ORALLY DISINTEGRATING ORAL EVERY 8 HOURS PRN
Status: CANCELLED | OUTPATIENT
Start: 2018-01-23

## 2018-01-23 RX ORDER — SODIUM CHLORIDE, SODIUM LACTATE, POTASSIUM CHLORIDE, CALCIUM CHLORIDE 600; 310; 30; 20 MG/100ML; MG/100ML; MG/100ML; MG/100ML
INJECTION, SOLUTION INTRAVENOUS CONTINUOUS
Status: CANCELLED | OUTPATIENT
Start: 2018-01-23

## 2018-01-23 RX ORDER — SODIUM CHLORIDE 9 MG/ML
INJECTION, SOLUTION INTRAVENOUS
Status: CANCELLED | OUTPATIENT
Start: 2018-01-23

## 2018-01-23 NOTE — PROGRESS NOTES
Doing ok, good fm, ready for baby, IOL set up for 1500 hours, pitocin, will place orders, scheduled w/ L&D, pt informed

## 2018-01-24 ENCOUNTER — ANESTHESIA (OUTPATIENT)
Dept: OBSTETRICS AND GYNECOLOGY | Facility: HOSPITAL | Age: 23
End: 2018-01-24
Payer: MEDICAID

## 2018-01-24 ENCOUNTER — HOSPITAL ENCOUNTER (INPATIENT)
Facility: HOSPITAL | Age: 23
LOS: 2 days | Discharge: HOME | End: 2018-01-26
Attending: OBSTETRICS & GYNECOLOGY | Admitting: OBSTETRICS & GYNECOLOGY
Payer: MEDICAID

## 2018-01-24 ENCOUNTER — ANESTHESIA EVENT (OUTPATIENT)
Dept: OBSTETRICS AND GYNECOLOGY | Facility: HOSPITAL | Age: 23
End: 2018-01-24
Payer: MEDICAID

## 2018-01-24 DIAGNOSIS — O99.213 OBESITY AFFECTING PREGNANCY IN THIRD TRIMESTER: ICD-10-CM

## 2018-01-24 DIAGNOSIS — O48.0 41 WEEKS GESTATION OF PREGNANCY: ICD-10-CM

## 2018-01-24 DIAGNOSIS — Z3A.41 41 WEEKS GESTATION OF PREGNANCY: ICD-10-CM

## 2018-01-24 LAB
ABO + RH BLD: NORMAL
BASOPHILS # BLD AUTO: 0.04 K/UL
BASOPHILS NFR BLD: 0.2 %
BLD GP AB SCN CELLS X3 SERPL QL: NORMAL
DIFFERENTIAL METHOD: ABNORMAL
EOSINOPHIL # BLD AUTO: 0.2 K/UL
EOSINOPHIL NFR BLD: 1.1 %
ERYTHROCYTE [DISTWIDTH] IN BLOOD BY AUTOMATED COUNT: 15.2 %
HCT VFR BLD AUTO: 28.7 %
HGB BLD-MCNC: 9 G/DL
HIV1+2 IGG SERPL QL IA.RAPID: NEGATIVE
LYMPHOCYTES # BLD AUTO: 3 K/UL
LYMPHOCYTES NFR BLD: 16.9 %
MCH RBC QN AUTO: 24.5 PG
MCHC RBC AUTO-ENTMCNC: 31.4 G/DL
MCV RBC AUTO: 78 FL
MONOCYTES # BLD AUTO: 0.7 K/UL
MONOCYTES NFR BLD: 4.1 %
NEUTROPHILS # BLD AUTO: 13.7 K/UL
NEUTROPHILS NFR BLD: 78.5 %
PLATELET # BLD AUTO: 412 K/UL
PMV BLD AUTO: 9.4 FL
RBC # BLD AUTO: 3.68 M/UL
RPR SER QL: NORMAL
WBC # BLD AUTO: 17.68 K/UL

## 2018-01-24 PROCEDURE — 0UQMXZZ REPAIR VULVA, EXTERNAL APPROACH: ICD-10-PCS | Performed by: ADVANCED PRACTICE MIDWIFE

## 2018-01-24 PROCEDURE — 0HQ9XZZ REPAIR PERINEUM SKIN, EXTERNAL APPROACH: ICD-10-PCS | Performed by: ADVANCED PRACTICE MIDWIFE

## 2018-01-24 PROCEDURE — 72200005 HC VAGINAL DELIVERY LEVEL II

## 2018-01-24 PROCEDURE — 10D17Z9 MANUAL EXTRACTION OF PRODUCTS OF CONCEPTION, RETAINED, VIA NATURAL OR ARTIFICIAL OPENING: ICD-10-PCS | Performed by: ADVANCED PRACTICE MIDWIFE

## 2018-01-24 PROCEDURE — 86592 SYPHILIS TEST NON-TREP QUAL: CPT

## 2018-01-24 PROCEDURE — 63600175 PHARM REV CODE 636 W HCPCS: Performed by: MIDWIFE

## 2018-01-24 PROCEDURE — 62326 NJX INTERLAMINAR LMBR/SAC: CPT | Performed by: ANESTHESIOLOGY

## 2018-01-24 PROCEDURE — 59409 OBSTETRICAL CARE: CPT | Mod: GB,,, | Performed by: ADVANCED PRACTICE MIDWIFE

## 2018-01-24 PROCEDURE — 85025 COMPLETE CBC W/AUTO DIFF WBC: CPT

## 2018-01-24 PROCEDURE — 86901 BLOOD TYPING SEROLOGIC RH(D): CPT

## 2018-01-24 PROCEDURE — 25000003 PHARM REV CODE 250: Performed by: MIDWIFE

## 2018-01-24 PROCEDURE — 72100002 HC LABOR CARE, 1ST 8 HOURS

## 2018-01-24 PROCEDURE — 51701 INSERT BLADDER CATHETER: CPT

## 2018-01-24 PROCEDURE — 86703 HIV-1/HIV-2 1 RESULT ANTBDY: CPT

## 2018-01-24 PROCEDURE — 3E033VJ INTRODUCTION OF OTHER HORMONE INTO PERIPHERAL VEIN, PERCUTANEOUS APPROACH: ICD-10-PCS | Performed by: ADVANCED PRACTICE MIDWIFE

## 2018-01-24 PROCEDURE — 25000003 PHARM REV CODE 250: Performed by: NURSE ANESTHETIST, CERTIFIED REGISTERED

## 2018-01-24 PROCEDURE — 11000001 HC ACUTE MED/SURG PRIVATE ROOM

## 2018-01-24 PROCEDURE — 63600175 PHARM REV CODE 636 W HCPCS: Performed by: NURSE ANESTHETIST, CERTIFIED REGISTERED

## 2018-01-24 PROCEDURE — 27800517 HC TRAY,EPIDURAL-CONTINUOUS: Performed by: NURSE ANESTHETIST, CERTIFIED REGISTERED

## 2018-01-24 RX ORDER — ONDANSETRON 8 MG/1
8 TABLET, ORALLY DISINTEGRATING ORAL EVERY 8 HOURS PRN
Status: DISCONTINUED | OUTPATIENT
Start: 2018-01-24 | End: 2018-01-25

## 2018-01-24 RX ORDER — OXYTOCIN/RINGER'S LACTATE 20/1000 ML
2 PLASTIC BAG, INJECTION (ML) INTRAVENOUS CONTINUOUS
Status: DISCONTINUED | OUTPATIENT
Start: 2018-01-24 | End: 2018-01-25

## 2018-01-24 RX ORDER — SODIUM CHLORIDE 9 MG/ML
INJECTION, SOLUTION INTRAVENOUS
Status: DISCONTINUED | OUTPATIENT
Start: 2018-01-24 | End: 2018-01-25

## 2018-01-24 RX ORDER — ROPIVACAINE HYDROCHLORIDE 2 MG/ML
INJECTION, SOLUTION EPIDURAL; INFILTRATION; PERINEURAL CONTINUOUS PRN
Status: DISCONTINUED | OUTPATIENT
Start: 2018-01-24 | End: 2018-01-26

## 2018-01-24 RX ORDER — SODIUM CHLORIDE, SODIUM LACTATE, POTASSIUM CHLORIDE, CALCIUM CHLORIDE 600; 310; 30; 20 MG/100ML; MG/100ML; MG/100ML; MG/100ML
INJECTION, SOLUTION INTRAVENOUS CONTINUOUS
Status: DISCONTINUED | OUTPATIENT
Start: 2018-01-24 | End: 2018-01-25

## 2018-01-24 RX ORDER — LIDOCAINE HYDROCHLORIDE AND EPINEPHRINE 15; 5 MG/ML; UG/ML
INJECTION, SOLUTION EPIDURAL
Status: DISCONTINUED | OUTPATIENT
Start: 2018-01-24 | End: 2018-01-26

## 2018-01-24 RX ORDER — ROPIVACAINE HYDROCHLORIDE 2 MG/ML
INJECTION, SOLUTION EPIDURAL; INFILTRATION; PERINEURAL
Status: DISCONTINUED | OUTPATIENT
Start: 2018-01-24 | End: 2018-01-26

## 2018-01-24 RX ADMIN — ONDANSETRON 8 MG: 8 TABLET, ORALLY DISINTEGRATING ORAL at 09:01

## 2018-01-24 RX ADMIN — LIDOCAINE HYDROCHLORIDE,EPINEPHRINE BITARTRATE 3 ML: 15; .005 INJECTION, SOLUTION EPIDURAL; INFILTRATION; INTRACAUDAL; PERINEURAL at 08:01

## 2018-01-24 RX ADMIN — Medication 2 MILLI-UNITS/MIN: at 05:01

## 2018-01-24 RX ADMIN — ROPIVACAINE HYDROCHLORIDE 14 ML: 2 INJECTION, SOLUTION EPIDURAL; INFILTRATION at 08:01

## 2018-01-24 RX ADMIN — ROPIVACAINE HYDROCHLORIDE 12 ML/HR: 2 INJECTION, SOLUTION EPIDURAL; INFILTRATION at 08:01

## 2018-01-24 RX ADMIN — SODIUM CHLORIDE, SODIUM LACTATE, POTASSIUM CHLORIDE, AND CALCIUM CHLORIDE: .6; .31; .03; .02 INJECTION, SOLUTION INTRAVENOUS at 04:01

## 2018-01-24 RX ADMIN — SODIUM CHLORIDE, SODIUM LACTATE, POTASSIUM CHLORIDE, AND CALCIUM CHLORIDE 1000 ML: .6; .31; .03; .02 INJECTION, SOLUTION INTRAVENOUS at 07:01

## 2018-01-24 NOTE — H&P
[facility] -   Obstetrics  History & Physical    Patient Name: Sybil Bal  MRN: [id number]  Admission Date: 2018  Primary Care Provider: Clary Monroe MD    Subjective:     Principal Problem:Labor and delivery indication for care or intervention    History of Present Illness:  22 year old  presents to labor and delivery for IOL    Obstetric HPI:  Patient reports irregular contractions, active fetal movement, No vaginal bleeding , No loss of fluid     This pregnancy has been complicated by smoker, HSV, obesity, depression     Obstetric History       T3      L3     SAB0   TAB0   Ectopic0   Multiple0   Live Births3       # Outcome Date GA Lbr Rene/2nd Weight Sex Delivery Anes PTL Lv   4 Current            3 Term 16 37w6d / 00:03 3.195 kg (7 lb 0.7 oz) M Vag-Spont EPI N JAVI      Apgar1:  8                Apgar5: 8   2 Term 10/03/14   3.09 kg (6 lb 13 oz)  Vag-Spont   JAVI   1 Term 12   3.544 kg (7 lb 13 oz) F Vag-Spont   JAVI        Past Medical History:   Diagnosis Date    Anemia     Anxiety     Bipolar 1 disorder     Morbid obesity     pt weighed 400# prior to gastric sleeve surgery    Tobacco use disorder      Past Surgical History:   Procedure Laterality Date    ABDOMINAL SURGERY      gastric sleeve      TONSILLECTOMY         PTA Medications   Medication Sig    butalbital-acetaminophen-caffeine -40 mg (FIORICET, ESGIC) -40 mg per tablet Take 2 tablets by mouth every 6 (six) hours as needed for Headaches.    PRENATAL VIT,ANNA 74/IRON/FOLIC (PRENATAL VITAMIN 1+1 ORAL) Take by mouth.    ziprasidone (GEODON) 80 MG capsule Take 80 mg by mouth once daily.    hydrOXYzine pamoate (VISTARIL) 25 MG Cap take 1 capsule by mouth four times a day    ranitidine (ZANTAC) 150 MG tablet Take 1 tablet (150 mg total) by mouth 2 (two) times daily.    valACYclovir (VALTREX) 500 MG tablet Take 1 tablet (500 mg total) by mouth once daily.       Review of  patient's allergies indicates:  No Known Allergies     Family History     None        Social History Main Topics    Smoking status: Heavy Tobacco Smoker     Packs/day: 1.00     Types: Cigarettes     Last attempt to quit: 2/10/2014    Smokeless tobacco: Never Used    Alcohol use No    Drug use: No    Sexual activity: Yes     Partners: Male     Birth control/ protection: None     Review of Systems   Objective:     Vital Signs (Most Recent):    Vital Signs (24h Range):        Weight: 117 kg (258 lb)  Body mass index is 50.39 kg/m².    FHT: 140's Cat 1 (reassuring)  TOCO: irregular    Physical Exam:   Constitutional: She is oriented to person, place, and time. She appears well-developed and well-nourished.      Neck: Normal range of motion.     Pulmonary/Chest: Effort normal.        Abdominal: Soft.     Genitourinary: Vagina normal.           Musculoskeletal: Normal range of motion.       Neurological: She is alert and oriented to person, place, and time.    Skin: Skin is dry.    Psychiatric: She has a normal mood and affect.       Cervix:  Dilation: 2-3 cm     Significant Labs:  Lab Results   Component Value Date    GROUPTRH O POS 2017    HEPBSAG Negative 2017    STREPBCULT No Group B Streptococcus isolated 2017       I have personallly reviewed all pertinent lab results from the last 24 hours.    Assessment/Plan:     22 y.o. female  at 41w1d for:    * Labor and delivery indication for care or intervention    Admit for IOL  Pitocin per protocol            Maryam Leary CNM  Obstetrics  [facility] -

## 2018-01-24 NOTE — HOSPITAL COURSE
Admit to labor and delivery  EFM/TOCO  SVE  Pitocin per protocol  1/25/18--PPD 1, normal PP course, d/c tomorrow  1/26/18 PP day 2 discharge home

## 2018-01-24 NOTE — SUBJECTIVE & OBJECTIVE
Obstetric HPI:  Patient reports irregular contractions, active fetal movement, No vaginal bleeding , No loss of fluid     This pregnancy has been complicated by smoker, HSV, obesity, depression     Obstetric History       T3      L3     SAB0   TAB0   Ectopic0   Multiple0   Live Births3       # Outcome Date GA Lbr Rene/2nd Weight Sex Delivery Anes PTL Lv   4 Current            3 Term 16 37w6d / 00:03 3.195 kg (7 lb 0.7 oz) M Vag-Spont EPI N JAVI      Apgar1:  8                Apgar5: 8   2 Term 10/03/14   3.09 kg (6 lb 13 oz)  Vag-Spont   JAVI   1 Term 12   3.544 kg (7 lb 13 oz) F Vag-Spont   JAVI        Past Medical History:   Diagnosis Date    Anemia     Anxiety     Bipolar 1 disorder     Morbid obesity     pt weighed 400# prior to gastric sleeve surgery    Tobacco use disorder      Past Surgical History:   Procedure Laterality Date    ABDOMINAL SURGERY      gastric sleeve      TONSILLECTOMY         PTA Medications   Medication Sig    butalbital-acetaminophen-caffeine -40 mg (FIORICET, ESGIC) -40 mg per tablet Take 2 tablets by mouth every 6 (six) hours as needed for Headaches.    PRENATAL VIT,ANNA 74/IRON/FOLIC (PRENATAL VITAMIN 1+1 ORAL) Take by mouth.    ziprasidone (GEODON) 80 MG capsule Take 80 mg by mouth once daily.    hydrOXYzine pamoate (VISTARIL) 25 MG Cap take 1 capsule by mouth four times a day    ranitidine (ZANTAC) 150 MG tablet Take 1 tablet (150 mg total) by mouth 2 (two) times daily.    valACYclovir (VALTREX) 500 MG tablet Take 1 tablet (500 mg total) by mouth once daily.       Review of patient's allergies indicates:  No Known Allergies     Family History     None        Social History Main Topics    Smoking status: Heavy Tobacco Smoker     Packs/day: 1.00     Types: Cigarettes     Last attempt to quit: 2/10/2014    Smokeless tobacco: Never Used    Alcohol use No    Drug use: No    Sexual activity: Yes     Partners: Male     Birth control/  protection: None     Review of Systems   Objective:     Vital Signs (Most Recent):    Vital Signs (24h Range):        Weight: 117 kg (258 lb)  Body mass index is 50.39 kg/m².    FHT: 140's Cat 1 (reassuring)  TOCO: irregular    Physical Exam:   Constitutional: She is oriented to person, place, and time. She appears well-developed and well-nourished.      Neck: Normal range of motion.     Pulmonary/Chest: Effort normal.        Abdominal: Soft.     Genitourinary: Vagina normal.           Musculoskeletal: Normal range of motion.       Neurological: She is alert and oriented to person, place, and time.    Skin: Skin is dry.    Psychiatric: She has a normal mood and affect.       Cervix:  Dilation: 2-3 cm     Significant Labs:  Lab Results   Component Value Date    GROUPTRH O POS 09/13/2017    HEPBSAG Negative 09/13/2017    STREPBCULT No Group B Streptococcus isolated 12/11/2017       I have personallly reviewed all pertinent lab results from the last 24 hours.

## 2018-01-24 NOTE — PROGRESS NOTES
"Discussed feeding choice with mother.  Reviewed benefits of breastfeeding.  Patient given "What to Expect in the First 48 Hours" handout. Mother states her intention is breastfeeding. Coffective counseling sheet Fall in Love discussed with mother. Reinforced immediate skin to skin, the magic first hour, importance of the first feeding and delaying routine procedures. Encouraged mother to download Coffective mobile loree if she has not already done so. Mother verbalies understanding.  "

## 2018-01-25 PROBLEM — S31.41XA VAGINAL LACERATION: Status: ACTIVE | Noted: 2018-01-25

## 2018-01-25 PROBLEM — Z34.83 SUPERVISION OF NORMAL INTRAUTERINE PREGNANCY IN MULTIGRAVIDA IN THIRD TRIMESTER: Status: RESOLVED | Noted: 2017-12-28 | Resolved: 2018-01-25

## 2018-01-25 PROBLEM — O40.3XX0 POLYHYDRAMNIOS AFFECTING PREGNANCY IN THIRD TRIMESTER: Status: RESOLVED | Noted: 2018-01-09 | Resolved: 2018-01-25

## 2018-01-25 PROBLEM — O99.213 OBESITY AFFECTING PREGNANCY IN THIRD TRIMESTER: Status: RESOLVED | Noted: 2017-09-12 | Resolved: 2018-01-25

## 2018-01-25 PROCEDURE — 99231 SBSQ HOSP IP/OBS SF/LOW 25: CPT | Mod: ,,, | Performed by: MIDWIFE

## 2018-01-25 PROCEDURE — 11000001 HC ACUTE MED/SURG PRIVATE ROOM

## 2018-01-25 PROCEDURE — 25000003 PHARM REV CODE 250: Performed by: MIDWIFE

## 2018-01-25 PROCEDURE — 25000003 PHARM REV CODE 250: Performed by: ADVANCED PRACTICE MIDWIFE

## 2018-01-25 RX ORDER — DIPHENHYDRAMINE HCL 25 MG
25 CAPSULE ORAL EVERY 4 HOURS PRN
Status: DISCONTINUED | OUTPATIENT
Start: 2018-01-25 | End: 2018-01-26 | Stop reason: HOSPADM

## 2018-01-25 RX ORDER — IBUPROFEN 600 MG/1
600 TABLET ORAL EVERY 6 HOURS PRN
Status: DISCONTINUED | OUTPATIENT
Start: 2018-01-25 | End: 2018-01-26 | Stop reason: HOSPADM

## 2018-01-25 RX ORDER — OXYTOCIN/RINGER'S LACTATE 20/1000 ML
99 PLASTIC BAG, INJECTION (ML) INTRAVENOUS CONTINUOUS
Status: DISPENSED | OUTPATIENT
Start: 2018-01-25 | End: 2018-01-25

## 2018-01-25 RX ORDER — SIMETHICONE 80 MG
1 TABLET,CHEWABLE ORAL 3 TIMES DAILY PRN
Status: DISCONTINUED | OUTPATIENT
Start: 2018-01-25 | End: 2018-01-26 | Stop reason: HOSPADM

## 2018-01-25 RX ORDER — AMMONIA 15 % (W/V)
0.3 AMPUL (EA) INHALATION CONTINUOUS PRN
Status: DISCONTINUED | OUTPATIENT
Start: 2018-01-25 | End: 2018-01-26 | Stop reason: HOSPADM

## 2018-01-25 RX ORDER — DOCUSATE SODIUM 100 MG/1
100 CAPSULE, LIQUID FILLED ORAL DAILY
Status: DISCONTINUED | OUTPATIENT
Start: 2018-01-25 | End: 2018-01-26 | Stop reason: HOSPADM

## 2018-01-25 RX ORDER — HYDROCORTISONE 25 MG/G
CREAM TOPICAL 3 TIMES DAILY PRN
Status: DISCONTINUED | OUTPATIENT
Start: 2018-01-25 | End: 2018-01-26 | Stop reason: HOSPADM

## 2018-01-25 RX ORDER — ACETAMINOPHEN 325 MG/1
650 TABLET ORAL EVERY 6 HOURS PRN
Status: DISCONTINUED | OUTPATIENT
Start: 2018-01-25 | End: 2018-01-26 | Stop reason: HOSPADM

## 2018-01-25 RX ORDER — ONDANSETRON 8 MG/1
8 TABLET, ORALLY DISINTEGRATING ORAL EVERY 8 HOURS PRN
Status: DISCONTINUED | OUTPATIENT
Start: 2018-01-25 | End: 2018-01-26 | Stop reason: HOSPADM

## 2018-01-25 RX ORDER — ROPIVACAINE IN 0.9% SOD CHL/PF 0.2 %
PLASTIC BAG, INJECTION (ML) EPIDURAL CONTINUOUS
Status: DISCONTINUED | OUTPATIENT
Start: 2018-01-25 | End: 2018-01-26 | Stop reason: HOSPADM

## 2018-01-25 RX ORDER — HYDROCODONE BITARTRATE AND ACETAMINOPHEN 5; 325 MG/1; MG/1
1 TABLET ORAL EVERY 4 HOURS PRN
Status: DISCONTINUED | OUTPATIENT
Start: 2018-01-25 | End: 2018-01-26 | Stop reason: HOSPADM

## 2018-01-25 RX ADMIN — SIMETHICONE CHEW TAB 80 MG 80 MG: 80 TABLET ORAL at 08:01

## 2018-01-25 RX ADMIN — HYDROCODONE BITARTRATE AND ACETAMINOPHEN 1 TABLET: 5; 325 TABLET ORAL at 12:01

## 2018-01-25 RX ADMIN — IBUPROFEN 600 MG: 600 TABLET, FILM COATED ORAL at 08:01

## 2018-01-25 RX ADMIN — DOCUSATE SODIUM 100 MG: 100 CAPSULE, LIQUID FILLED ORAL at 08:01

## 2018-01-25 RX ADMIN — IBUPROFEN 600 MG: 600 TABLET, FILM COATED ORAL at 01:01

## 2018-01-25 RX ADMIN — SIMETHICONE CHEW TAB 80 MG 80 MG: 80 TABLET ORAL at 01:01

## 2018-01-25 RX ADMIN — IBUPROFEN 600 MG: 600 TABLET, FILM COATED ORAL at 06:01

## 2018-01-25 NOTE — ANESTHESIA PROCEDURE NOTES
Epidural    Patient location during procedure: OB   Reason for block: primary anesthetic   Diagnosis: iup   Start time: 1/24/2018 8:02 PM  Timeout: 1/24/2018 8:00 PM  Staffing  Anesthesiologist: LUCHO LEWIS  Resident/CRNA: CRAIG GREEN  Performed: anesthesiologist   Preanesthetic Checklist  Completed: patient identified, pre-op evaluation, timeout performed, IV checked, risks and benefits discussed, monitors and equipment checked, anesthesia consent given, hand hygiene performed and patient being monitored  Preparation  Patient position: sitting  Prep: Betadine  Patient monitoring: Pulse Ox and Blood Pressure  Epidural  Skin Anesthetic: lidocaine 1%  Skin Wheal: 3 mL  Administration type: continuous  Approach: midline  Interspace: L3-4  Injection technique: GAURAV air  Needle and Epidural Catheter  Needle type: Tuohy   Needle gauge: 18  Needle length: 3.5 inches  Needle insertion depth: 8.5 cm  Catheter at skin depth: 14 cm  Test dose: 3 mL of lidocaine 1.5% with Epi 1-to-200,000  Additional Documentation: incremental injection, negative aspiration for heme and CSF, no signs/symptoms of IV or SA injection, no paresthesia on injection, no significant pain on injection and no significant complaints from patient  Medications:  Bolus administered: 14 mL of 0.2% ropivacaine  Volume per aspiration: 5 mL  Time between aspirations: 1 minutes  Assessment  Ease of block: easy  Patient's tolerance of the procedure: comfortable throughout block

## 2018-01-25 NOTE — NURSING
Seen pt eating pizza, reinforced importance of NPO. Pt and family members verbalized understanding.

## 2018-01-25 NOTE — PLAN OF CARE
Problem: Patient Care Overview  Goal: Plan of Care Review  Outcome: Ongoing (interventions implemented as appropriate)  Delivered viable baby girl via , skin to skin contact initiated, cord clamping delayed, placenta delivered spontaneously. Uterus firm and contracted with minimal vaginal bleeding on recovery period. Mother and baby bonding appropriately. Breastfeeding.

## 2018-01-25 NOTE — PROGRESS NOTES
[facility] -   Obstetrics  Postpartum Progress Note    Patient Name: Sybil Bal  MRN: [id number]  Admission Date: 2018  Hospital Length of Stay: 1 days  Attending Physician: Garett Pat MD  Primary Care Provider: Clary Monroe MD    Subjective:     Principal Problem: (normal spontaneous vaginal delivery)    Hospital course: Admit to labor and delivery  EFM/TOCO  SVE  Pitocin per protocol  18--PPD 1, normal PP course, d/c tomorrow    Interval History:     She is doing well this morning. She is tolerating a regular diet without nausea or vomiting. She is voiding spontaneously. She is ambulating. She has passed flatus, and has not a BM. Vaginal bleeding is mild. She denies fever or chills. Abdominal pain is mild and controlled with oral medications. She is breastfeeding. She desires circumcision for her male baby: not applicable.    Objective:     Vital Signs (Most Recent):  Temp: 98.4 °F (36.9 °C) (18 08)  Pulse: 76 (18 0800)  Resp: 18 (18 0800)  BP: (!) 144/79 (18 08)  SpO2: 100 % (18) Vital Signs (24h Range):  Temp:  [98 °F (36.7 °C)-98.5 °F (36.9 °C)] 98.4 °F (36.9 °C)  Pulse:  [] 76  Resp:  [18-20] 18  SpO2:  [98 %-100 %] 100 %  BP: ()/(42-96) 144/79     Weight: 117 kg (258 lb)  Body mass index is 50.39 kg/m².      Intake/Output Summary (Last 24 hours) at 18 0849  Last data filed at 18 0600   Gross per 24 hour   Intake                0 ml   Output              500 ml   Net             -500 ml       Significant Labs:  Lab Results   Component Value Date    GROUPTRH O POS 2018    HEPBSAG Negative 2017    STREPBCULT No Group B Streptococcus isolated 2017       Recent Labs  Lab 18  1615   HGB 9.0*   HCT 28.7*       I have personallly reviewed all pertinent lab results from the last 24 hours.  Recent Lab Results       18  1615      Baso # 0.04     Basophil% 0.2     Differential Method  Automated     Eos # 0.2     Eosinophil% 1.1     Gran # 13.7(H)     Gran% 78.5(H)     Group & Rh O POS     Hematocrit 28.7(L)     Hemoglobin 9.0(L)     HIV Rapid Testing Negative     INDIRECT ALFRED NEG     Lymph # 3.0     Lymph% 16.9(L)     MCH 24.5(L)     MCHC 31.4(L)     MCV 78(L)     Mono # 0.7     Mono% 4.1     MPV 9.4     Platelets 412(H)     RBC 3.68(L)     RDW 15.2(H)     RPR Non-reactive     WBC 17.68(H)           Physical Exam:   Constitutional: She is oriented to person, place, and time. She appears well-developed and well-nourished.    HENT:   Head: Normocephalic and atraumatic.     Neck: Normal range of motion. Neck supple.    Cardiovascular: Normal rate.     Pulmonary/Chest: Effort normal.        Abdominal: Soft.     Genitourinary:   Genitourinary Comments: FFML @ U/E, lochia small/mod           Musculoskeletal: Normal range of motion and moves all extremeties. She exhibits edema.       Neurological: She is alert and oriented to person, place, and time. She has normal reflexes.    Skin: Skin is warm and dry.    Psychiatric: She has a normal mood and affect. Her behavior is normal. Judgment and thought content normal.       Assessment/Plan:     22 y.o. female  for:    *  (normal spontaneous vaginal delivery)    Routine pp care        Vaginal laceration    First degree left labial tear repaired with 2-0 Vicryl SH, normal PP care        Liveborn infant of saeed pregnancy    Care of infant per PEDS dept            Disposition: As patient meets milestones, will plan to discharge tomorrow.    Michael Hand CNM  Obstetrics  [facility]

## 2018-01-25 NOTE — ANESTHESIA PREPROCEDURE EVALUATION
01/24/2018  Sybil Bal is a 22 y.o., female.    Anesthesia Evaluation    I have reviewed the Patient Summary Reports.    I have reviewed the Nursing Notes.      Review of Systems  Anesthesia Hx:  No problems with previous Anesthesia  Denies Family Hx of Anesthesia complications.   Denies Personal Hx of Anesthesia complications.   Social:  Smoker    Hematology/Oncology:  Hematology Normal   Oncology Normal     EENT/Dental:EENT/Dental Normal   Cardiovascular:  Cardiovascular Normal Exercise tolerance: good     Pulmonary:  Pulmonary Normal    Renal/:  Renal/ Normal     Hepatic/GI:  Hepatic/GI Normal    Musculoskeletal:  Musculoskeletal Normal    Neurological:  Neurology Normal    Psych:   Psychiatric History          Physical Exam  General:  Morbid Obesity    Airway/Jaw/Neck:  Airway Findings: Mouth Opening: Normal Tongue: Normal  General Airway Assessment: Adult, Average  Mallampati: II  Improves to II with phonation.  TM Distance: Normal, at least 6 cm        Eyes/Ears/Nose:  EYES/EARS/NOSE FINDINGS: Normal   Dental:  Dental Findings: In tact   Chest/Lungs:  Chest/Lungs Findings: Normal Respiratory Rate     Heart/Vascular:  Heart Findings: Rate: Normal  Rhythm: Regular Rhythm  Heart murmur: negative Vascular Findings: Normal    Abdomen:  Abdomen Findings: Normal    Musculoskeletal:  Musculoskeletal Findings: Normal   Skin:  Skin Findings: Normal    Mental Status:  Mental Status Findings:  Cooperative, Alert and Oriented         Anesthesia Plan  Type of Anesthesia, risks & benefits discussed:  Anesthesia Type:  epidural  Patient's Preference:   Intra-op Monitoring Plan:   Intra-op Monitoring Plan Comments:   Post Op Pain Control Plan:   Post Op Pain Control Plan Comments:   Induction:    Beta Blocker:  Patient is not currently on a Beta-Blocker (No further documentation required).       Informed  Consent: Patient understands risks and agrees with Anesthesia plan.  Questions answered. Anesthesia consent signed with patient.  ASA Score: 2     Day of Surgery Review of History & Physical: I have interviewed and examined the patient. I have reviewed the patient's H&P dated:  There are no significant changes.          Ready For Surgery From Anesthesia Perspective.

## 2018-01-25 NOTE — L&D DELIVERY NOTE
[facility]  Vaginal Delivery   Operative Note    SUMMARY     Normal spontaneous vaginal delivery of live infant, was placed on mothers abdomen for skin to skin and bulb suctioning performed.  Infant delivered position HARVEY over perineum.  Nuchal cord: Yes, cord reduced following delivery.    Manual delivery of placenta and IV pitocin given noting good uterine tone.  1st degree laceration noted.  Patient tolerated delivery well. Sponge needle and lap counted correctly x2.    Indications: Labor and delivery indication for care or intervention  Pregnancy complicated by:   Patient Active Problem List   Diagnosis    Bipolar affective disorder - takes Geodon    Tobacco smoking affecting pregnancy    HSV-2 infection    Anemia    Obesity affecting pregnancy in third trimester    Request for sterilization- Consented.     Supervision of normal intrauterine pregnancy in multigravida in third trimester    Polyhydramnios affecting pregnancy in third trimester    Labor and delivery indication for care or intervention     (normal spontaneous vaginal delivery)    Liveborn infant of saeed pregnancy    Vaginal laceration     Admitting GA: 41w2d    Delivery Information for  Isaiah Bal    Birth information:  YOB: 2019   Time of birth: 11:32 PM   Sex: female   Head Delivery Date/Time: 2018 11:32 PM   Delivery type: Vaginal, Spontaneous Delivery   Gestational Age: 41w1d    Delivery Providers    Delivering clinician:  Maryam Leary CNM   Other personnel:   Provider Role   Lucia Toussaint, RN Registered Nurse   Lisa Dodson, RN Registered Nurse   Ken Lai, [facility]   Kaibeh H. London, RN Registered Nurse                   Sultan Assessment    Living status:  Living  Apgars:     1 Minute:   5 Minute:   10 Minute:   15 Minute:   20 Minute:     Skin Color:          Heart Rate:          Reflex Irritability:          Muscle Tone:          Respiratory  Effort:          Total:                         Assisted Delivery Details:    Forceps attempted?:  No  Vacuum extractor attempted?:  No         Shoulder Dystocia    Shoulder dystocia present?:  No           Presentation and Position    Presentation:   Vertex   Position:   Left    Occiput    Anterior            Interventions/Resuscitation    Method:  Bulb Suctioning, Tactile Stimulation       Cord    Vessels:  3 vessels  Complications:  Nuchal  Nuchal Intervention:  reduced  Number of Loops:  2  Delayed Cord Clamping?:  Yes  Cord Clamped Date/Time:  2018 11:34 PM  Cord Blood Disposition:  Lab       Placenta    Date and time:  2018 11:40 PM  Removal:  Spontaneous  Appearance:  Intact  Placenta disposition:  discarded           Labor Events:       labor: No     Labor Onset Date/Time:         Dilation Complete Date/Time:         Start Pushing Date/Time:       Rupture Date/Time:              Rupture type:           Fluid Amount:        Fluid Color:        Fluid Odor:        Membrane Status (PeriCalm):        Rupture Date/Time (PeriCalm):        Fluid Amount (PeriCalm):        Fluid Color (PeriCalm):         steroids: None     Antibiotics given for GBS: No     Induction: oxytocin     Indications for induction:  Post-term Gestation     Augmentation:       Indications for augmentation:       Labor complications: None     Additional complications:          Cervical ripening:                     Delivery:      Episiotomy: None     Indication for Episiotomy:       Perineal Lacerations: None Repaired:      Periurethral Laceration: none Repaired:     Labial Laceration: right Repaired: Yes   Sulcus Laceration: none Repaired:     Vaginal Laceration: No Repaired:     Cervical Laceration: No Repaired:     Repair suture:       Repair # of packets:       Vaginal delivery QBL (mL): 200      QBL (mL): 0     Combined Blood Loss (mL): 200     Vaginal Sweep Performed: No     Surgicount Correct:          Other providers:       Anesthesia    Method:  Epidural          Details (if applicable):  Trial of Labor      Categorization:      Priority:     Indications for :     Incision Type:       Additional  information:  Forceps:    Vacuum:    Breech:    Observed anomalies    Other (Comments):

## 2018-01-25 NOTE — PROVIDER TRANSFER
[facility]  Transfer of Care Note      Patient Name: Sybil Bal  MRN: [id number]  Admission Date: 2018  Hospital Length of Stay: 1 days  Transfer Date: 2018  Attending Physician: Garett Pat MD   Primary Care Provider: Clary Monroe MD  Reason for Admission: IOL    HPI:   22 year old  presents to labor and delivery for IOL    Hospital Course:   Admit to labor and delivery  EFM/TOCO  SVE  Pitocin per protocol    * Labor and delivery indication for care or intervention    Admit for IOL  Pitocin per protocol        Vaginal laceration    First degree left labial tear repaired with 2-0 Vicryl SH         (normal spontaneous vaginal delivery)    Routine pp care            * No surgery found *    Diet Orders          Diet NPO Except for: Ice Chips, Other (Comment): NPO starting at  1618        Activity Orders          Early ambulation starting at  1618    Ambulate starting at  1618    Change position, roll left starting at  1617    Change position, roll right starting at  1617    Straight Cath starting at  1617        Pending Diagnostic Studies:     None        Maryam Leary CNM  [facility]

## 2018-01-25 NOTE — SUBJECTIVE & OBJECTIVE
Hospital course: Admit to labor and delivery  EFM/TOCO  SVE  Pitocin per protocol  1/25/18--PPD 1, normal PP course, d/c tomorrow    Interval History:     She is doing well this morning. She is tolerating a regular diet without nausea or vomiting. She is voiding spontaneously. She is ambulating. She has passed flatus, and has not a BM. Vaginal bleeding is mild. She denies fever or chills. Abdominal pain is mild and controlled with oral medications. She is breastfeeding. She desires circumcision for her male baby: not applicable.    Objective:     Vital Signs (Most Recent):  Temp: 98.4 °F (36.9 °C) (01/25/18 0800)  Pulse: 76 (01/25/18 0800)  Resp: 18 (01/25/18 0800)  BP: (!) 144/79 (01/25/18 0800)  SpO2: 100 % (01/24/18 2002) Vital Signs (24h Range):  Temp:  [98 °F (36.7 °C)-98.5 °F (36.9 °C)] 98.4 °F (36.9 °C)  Pulse:  [] 76  Resp:  [18-20] 18  SpO2:  [98 %-100 %] 100 %  BP: ()/(42-96) 144/79     Weight: 117 kg (258 lb)  Body mass index is 50.39 kg/m².      Intake/Output Summary (Last 24 hours) at 01/25/18 0849  Last data filed at 01/25/18 0600   Gross per 24 hour   Intake                0 ml   Output              500 ml   Net             -500 ml       Significant Labs:  Lab Results   Component Value Date    GROUPTRH O POS 01/24/2018    HEPBSAG Negative 09/13/2017    STREPBCULT No Group B Streptococcus isolated 12/11/2017       Recent Labs  Lab 01/24/18  1615   HGB 9.0*   HCT 28.7*       I have personallly reviewed all pertinent lab results from the last 24 hours.  Recent Lab Results       01/24/18  1615      Baso # 0.04     Basophil% 0.2     Differential Method Automated     Eos # 0.2     Eosinophil% 1.1     Gran # 13.7(H)     Gran% 78.5(H)     Group & Rh O POS     Hematocrit 28.7(L)     Hemoglobin 9.0(L)     HIV Rapid Testing Negative     INDIRECT ALFRED NEG     Lymph # 3.0     Lymph% 16.9(L)     MCH 24.5(L)     MCHC 31.4(L)     MCV 78(L)     Mono # 0.7     Mono% 4.1     MPV 9.4     Platelets 412(H)      RBC 3.68(L)     RDW 15.2(H)     RPR Non-reactive     WBC 17.68(H)           Physical Exam:   Constitutional: She is oriented to person, place, and time. She appears well-developed and well-nourished.    HENT:   Head: Normocephalic and atraumatic.     Neck: Normal range of motion. Neck supple.    Cardiovascular: Normal rate.     Pulmonary/Chest: Effort normal.        Abdominal: Soft.     Genitourinary:   Genitourinary Comments: FFML @ U/E, lochia small/mod           Musculoskeletal: Normal range of motion and moves all extremeties. She exhibits edema.       Neurological: She is alert and oriented to person, place, and time. She has normal reflexes.    Skin: Skin is warm and dry.    Psychiatric: She has a normal mood and affect. Her behavior is normal. Judgment and thought content normal.

## 2018-01-25 NOTE — NURSING
"Pt complained of feeling "shaky". States she has felt this way since mid-labor. States she has eaten breakfast and lunch and has drank water throughout the day. Vital signs normal. Pt denies dizziness, shortness of breath, nor faintness. Pt currently ambulating around the room well. Pt has hx of anxiety and BPD and missed her dose of Geodon last night. She thinks the shakiness may be related to missing her medication. Will con't to monitor. Encouraged pt to notify RN for any worsening symptoms. Encouraged pt to relax and try to sleep in between feeding of infant.  "

## 2018-01-25 NOTE — PROGRESS NOTES
S: States uc's getting more intense    O:  VS reviewed, afebrile  FHTs 140's, reassuring   UC's q 2-3 min  SVE 5cm, 80%, -2 per RN      A: IUP @ 41w1d for IOL    P:Epidural at pt request

## 2018-01-25 NOTE — LACTATION NOTE
Lactation rounds  Lactation packet given and admit information reviewed. Mother verbalizes understanding of expected  behaviors and output for the first 48 hours of life.  Discussed the importance of cue based feedings on demand, unrestricted access to the breast, and frequent uninterrupted skin to skin contact.  Risk and implications of artificial nipples and supplementation discussed.  Encouraged mother to call for assistance when desired or when infant is showing signs of hunger, contact number provided, mother verbalizes understanding.    Baby with low blood sugar; instructed mother how to hand express; mother had the good technique, however, doesn't compress enough the glandular tissue; mother verbalized that she is very tired.   Assisted mother into hand expressing 5 ml of colostrum easily.   Attempted to feed colostrum to baby; baby is very very nauseous, unable to keep any milk, spitting up.     Primary nurse notified.

## 2018-01-26 VITALS
RESPIRATION RATE: 18 BRPM | DIASTOLIC BLOOD PRESSURE: 81 MMHG | OXYGEN SATURATION: 100 % | TEMPERATURE: 98 F | SYSTOLIC BLOOD PRESSURE: 133 MMHG | HEIGHT: 60 IN | WEIGHT: 258 LBS | HEART RATE: 93 BPM | BODY MASS INDEX: 50.65 KG/M2

## 2018-01-26 PROCEDURE — 99238 HOSP IP/OBS DSCHRG MGMT 30/<: CPT | Mod: ,,, | Performed by: ADVANCED PRACTICE MIDWIFE

## 2018-01-26 PROCEDURE — 25000003 PHARM REV CODE 250: Performed by: ADVANCED PRACTICE MIDWIFE

## 2018-01-26 RX ORDER — FERROUS SULFATE 325(65) MG
325 TABLET ORAL DAILY
Qty: 30 TABLET | Refills: 3 | Status: SHIPPED | OUTPATIENT
Start: 2018-01-26 | End: 2018-03-20

## 2018-01-26 RX ADMIN — DOCUSATE SODIUM 100 MG: 100 CAPSULE, LIQUID FILLED ORAL at 08:01

## 2018-01-26 NOTE — PLAN OF CARE
Problem: Patient Care Overview  Goal: Plan of Care Review  Outcome: Ongoing (interventions implemented as appropriate)  Pt is progressing well. Pain is controlled with PO pain meds. Ambulates independently and voids without difficulty. Breastfeeding. Bonding well with baby. VSS. Will continue to monitor.

## 2018-01-26 NOTE — ANESTHESIA POSTPROCEDURE EVALUATION
Anesthesia Post Evaluation    Patient: Sybil Bal    Procedure(s) Performed: * No procedures listed *    Final Anesthesia Type: epidural  Patient location during evaluation: labor & delivery  Patient participation: Yes- Able to Participate  Level of consciousness: awake and alert  Post-procedure vital signs: reviewed and stable  Pain management: adequate  Airway patency: patent  PONV status at discharge: No PONV  Anesthetic complications: no      Cardiovascular status: blood pressure returned to baseline  Respiratory status: unassisted  Hydration status: euvolemic  Follow-up not needed.        Visit Vitals  /65 (BP Location: Right arm, Patient Position: Lying)   Pulse 64   Temp 36.9 °C (98.5 °F) (Oral)   Resp 19   Ht 5' (1.524 m)   Wt 117 kg (258 lb)   SpO2 100%   Breastfeeding? Unknown   BMI 50.39 kg/m²       Pain/Loida Score: Pain Rating Prior to Med Admin: 0 (1/25/2018  8:11 PM)  Pain Rating Post Med Admin: 0 (1/25/2018  9:11 PM)

## 2018-01-26 NOTE — DISCHARGE SUMMARY
[facility] -   Obstetrics  Discharge Summary      Patient Name: Sybil Bal  MRN: [id number]  Admission Date: 2018  Hospital Length of Stay: 2 days  Discharge Date and Time:  2018 9:05 AM  Attending Physician: Garett Pat MD   Discharging Provider: Emiliana Godinez CNM  Primary Care Provider: Clary Monroe MD    HPI: 22 year old  presents to labor and delivery for IOL    * No surgery found *     Hospital Course:   Admit to labor and delivery  EFM/TOCO  SVE  Pitocin per protocol  18--PPD 1, normal PP course, d/c tomorrow  18 PP day 2 discharge home will begin iron for iron deficiency anemia        Final Active Diagnoses:    Diagnosis Date Noted POA    PRINCIPAL PROBLEM:   (normal spontaneous vaginal delivery) [O80] 2018 Not Applicable    Liveborn infant of saeed pregnancy [Z38.2] 2018 No    Vaginal laceration [S31.41XA] 2018 No      Problems Resolved During this Admission:    Diagnosis Date Noted Date Resolved POA    Labor and delivery indication for care or intervention [O75.9] 2018 Yes    Obesity affecting pregnancy in third trimester [O99.213] 2017 Yes        Labs:   CBC   Recent Labs  Lab 18  1615   WBC 17.68*   HGB 9.0*   HCT 28.7*   *       Feeding Method: breast    Immunizations     Date Immunization Status Dose Route/Site Given by    18 0111 MMR Incomplete 0.5 mL Subcutaneous/Left deltoid     18 0111 Tdap Incomplete 0.5 mL Intramuscular/Left deltoid           Delivery:    Episiotomy: None   Lacerations: None   Repair suture:     Repair # of packets:     Blood loss (ml): 200     Birth information:  YOB: 2019   Time of birth: 11:32 PM   Sex: female   Delivery type: Vaginal, Spontaneous Delivery   Gestational Age: 41w1d    Delivery Clinician:      Other providers:       Additional  information:  Forceps:    Vacuum:    Breech:    Observed anomalies       Living?:           APGARS  One minute Five minutes Ten minutes   Skin color:         Heart rate:         Grimace:         Muscle tone:         Breathing:         Totals: 7  9        Placenta: Delivered:       appearance    Pending Diagnostic Studies:     None          Discharged Condition: good    Disposition: Home or Self Care    Follow Up:  Follow-up Information     Follow up In 4 weeks.               Patient Instructions:     Activity as tolerated       Medications:  Current Discharge Medication List      START taking these medications    Details   ferrous sulfate 325 mg (65 mg iron) Tab tablet Take 1 tablet (325 mg total) by mouth once daily.  Qty: 30 tablet, Refills: 3         CONTINUE these medications which have NOT CHANGED    Details   butalbital-acetaminophen-caffeine -40 mg (FIORICET, ESGIC) -40 mg per tablet Take 2 tablets by mouth every 6 (six) hours as needed for Headaches.  Qty: 20 tablet, Refills: 1    Associated Diagnoses: Pregnancy headache in third trimester      PRENATAL VIT,ANNA 74/IRON/FOLIC (PRENATAL VITAMIN 1+1 ORAL) Take by mouth.      ziprasidone (GEODON) 80 MG capsule Take 80 mg by mouth once daily.      hydrOXYzine pamoate (VISTARIL) 25 MG Cap take 1 capsule by mouth four times a day  Qty: 30 capsule, Refills: 2      ranitidine (ZANTAC) 150 MG tablet Take 1 tablet (150 mg total) by mouth 2 (two) times daily.  Qty: 60 tablet, Refills: 3         STOP taking these medications       valACYclovir (VALTREX) 500 MG tablet Comments:   Reason for Stopping:               Emiliana Godinez CNM  Obstetrics  [facility] -

## 2018-01-26 NOTE — DISCHARGE INSTRUCTIONS
Mother Self Care:    Activity: Avoid strenuous exercise and get adequate rest.  No driving until your physician gives you consent.  Emotional Changes: The grieving process has many different stages, be prepared to experience lots of emotional ups and downs. Identify people to be your support system, and do not hesitate to call our  if you need someone to talk to.   Breast Care: You may notice milk leaking from your breasts. Wear a support bra 24 hours a day for one week or wrap breasts in an ace bandage if needed to stop milk production.  Avoid stimulation to breasts.  You may use ice packs for discomfort.  Morgan-Care/Vaginal Bleeding: Remember to use your morgan-bottle after urinating.  Your flow will change from red, to pink, to yellow/white color over a period of 2 weeks.  Menstruation will return in 3-8 weeks.  Episiotomy Vaginal Delivery: Stitches will dissolve within 10 days to 3 weeks.  Warm baths, tucks, and dermoplast spray will promote healing.  Avoid bubble baths or strong soaps.   Section/Tubal Ligation: Keep incision clean and dry.  Please remove steri-strips in 5-7 days.  You may shower, but avoid baths.  Sexual Activity/Pelvic Rest: No sexual activity, tampons, or douching until your physician gives you consent.  Diet: Continue to eat from the five basic food groups, including plenty of protein, fruits, vegetables, and whole grains.  Limit empty calories and high fat foods.  Drink enough fluids to satisfy thirst.  Constipation/Hemorrhoids: Drink plenty of water.  You may take a stool softener or natural laxative (Metamucil). You may use tucks or hemorrhoid ointment and soak in a warm tub.    CALL YOUR OB DOCTOR IF ANY OF THE FOLLOWING OCCURS:  *Heavy bleeding - saturating a pad an hour or passing any large (2-3 inches in size) blood clots.  *Any pain, redness, or tenderness in lower leg.  *You cannot care for yourself  *Any signs of infection-      - Temperature greater than 100.5  degrees F      - Foul smelling vaginal discharge and/or incisional drainage      - Increased episiotomy or incisional pain      - Hot, hard, red or sore area on breast      - Flu-like symptoms      - Any urgency, frequency or burning with urination

## 2018-01-26 NOTE — ANESTHESIA RELEASE NOTE
Anesthesia Release from PACU Note    Patient: Sybil Bal    Procedure(s) Performed: * No procedures listed *    Anesthesia type: epidural    Post pain: Adequate analgesia    Post assessment: no apparent anesthetic complications, tolerated procedure well and no evidence of recall    Last Vitals:   Visit Vitals  /65 (BP Location: Right arm, Patient Position: Lying)   Pulse 64   Temp 36.9 °C (98.5 °F) (Oral)   Resp 19   Ht 5' (1.524 m)   Wt 117 kg (258 lb)   SpO2 100%   Breastfeeding? Unknown   BMI 50.39 kg/m²       Post vital signs: stable    Level of consciousness: awake    Nausea/Vomiting: no nausea/no vomiting    Complications: none    Airway Patency: patent    Respiratory: unassisted    Cardiovascular: stable and blood pressure at baseline    Hydration: euvolemic

## 2018-01-26 NOTE — PROGRESS NOTES
[facility] -   Obstetrics  Postpartum Progress Note    Patient Name: Sybil Bal  MRN: [id number]  Admission Date: 2018  Hospital Length of Stay: 2 days  Attending Physician: Garett Pat MD  Primary Care Provider: Clary Monroe MD    Subjective:     Principal Problem: (normal spontaneous vaginal delivery)    Hospital course: Admit to labor and delivery  EFM/TOCO  SVE  Pitocin per protocol  18--PPD 1, normal PP course, d/c tomorrow  18 PP day 2 discharge home    Interval History:     She is doing well this morning. She is tolerating a regular diet without nausea or vomiting. She is voiding spontaneously. She is ambulating. She has passed flatus, and has a BM. Vaginal bleeding is mild. She denies fever or chills. Abdominal pain is mild and controlled with oral medications. She is breastfeeding. She desires circumcision for her male baby: not applicable.    Objective:     Vital Signs (Most Recent):  Temp: 97.8 °F (36.6 °C) (18 0800)  Pulse: 75 (18 0800)  Resp: 18 (18 0800)  BP: 136/81 (18 0800)  SpO2: 100 % (18) Vital Signs (24h Range):  Temp:  [97.8 °F (36.6 °C)-98.5 °F (36.9 °C)] 97.8 °F (36.6 °C)  Pulse:  [63-81] 75  Resp:  [18-20] 18  BP: (134-145)/(65-82) 136/81     Weight: 117 kg (258 lb)  Body mass index is 50.39 kg/m².    No intake or output data in the 24 hours ending 18 0857    Significant Labs:  Lab Results   Component Value Date    GROUPTRH O POS 2018    HEPBSAG Negative 2017    STREPBCULT No Group B Streptococcus isolated 2017       Recent Labs  Lab 18  1615   HGB 9.0*   HCT 28.7*       I have personallly reviewed all pertinent lab results from the last 24 hours.    Physical Exam:   Constitutional: She is oriented to person, place, and time. She appears well-developed and well-nourished.     Eyes: Conjunctivae are normal. Pupils are equal, round, and reactive to light.    Neck: Normal range of  motion.    Cardiovascular: Normal rate and regular rhythm.     Pulmonary/Chest: Breath sounds normal.        Abdominal: Soft.     Genitourinary: Vagina normal and uterus normal.           Musculoskeletal: Normal range of motion and moves all extremeties.       Neurological: She is alert and oriented to person, place, and time.    Skin: Skin is warm.    Psychiatric: She has a normal mood and affect. Her behavior is normal. Thought content normal.       Assessment/Plan:     22 y.o. female  for:    *  (normal spontaneous vaginal delivery)    Routine pp care  Breastfeeding  Plans BTL appointment made        Vaginal laceration    First degree left labial tear repaired with 2-0 Vicryl SH, normal PP care        Liveborn infant of saeed pregnancy    Care of infant per PEDS dept            Disposition: As patient meets milestones, will plan to discharge today    Emiliana Godinez CNM  Obstetrics  [facility] [place]

## 2018-01-26 NOTE — LACTATION NOTE
Infant weight loss and output is WDL.  Lactation discharge information reviewed.  Mother is aware of warm line, and outpatient consultations and monthly support gatherings. Encouraged mother to contact lactation with any questions, concerns, or problems. Contact numbers provided, and mother verbalizes understanding.  Mother on Geodon; L2, probably compatible. Mother slightly distant at this time, states that this is her fourth baby and she denies any concerns at this time. Support offered.      01/26/18 1100   Infant Assessment   Weight Loss (%) 5.3   Number of Stools (24 hours) 1   Number of Voids (24 hours) 3   Pain/Comfort Assessments   Pain Assessment Performed Yes       Number Scale   Presence of Pain denies   Maternal Infant Feeding   Maternal Preparation breast care;hand hygiene   Maternal Emotional State independent;relaxed   Breastfeeding Education adequate infant intake;adequate milk volume;diet;importance of skin-to-skin contact    Following Delivery yes   Lactation Interventions   Attachment Promotion breastfeeding assistance provided;counseling provided;environment adjusted;infant-mother separation minimized;privacy provided;role responsibility promoted;rooming-in promoted;skin-to-skin contact encouraged;family involvement promoted;face-to-face positioning promoted   Breast Care: Breastfeeding manual expression to soften breast;milk massaged towards nipple;lanolin to nipple(s) applied   Breastfeeding Assistance assisted with positioning;both breasts offered each feeding;feeding cue recognition promoted;feeding on demand promoted;support offered   Maternal Breastfeeding Support encouragement offered;infant-mother separation minimized;lactation counseling provided   Latch Promotion positioning assisted

## 2018-01-26 NOTE — TRANSFER OF CARE
Anesthesia Transfer of Care Note    Patient: Sybil Bal    Procedure(s) Performed: * No procedures listed *    Patient location: Labor and Delivery    Anesthesia Type: epidural    Post pain: adequate analgesia    Post assessment: no apparent anesthetic complications    Post vital signs: stable    Level of consciousness: awake, alert and oriented    Nausea/Vomiting: no nausea/vomiting    Complications: none    Transfer of care protocol was followed      Last vitals:   Visit Vitals  /65 (BP Location: Right arm, Patient Position: Lying)   Pulse 64   Temp 36.9 °C (98.5 °F) (Oral)   Resp 19   Ht 5' (1.524 m)   Wt 117 kg (258 lb)   SpO2 100%   Breastfeeding? Unknown   BMI 50.39 kg/m²

## 2018-01-26 NOTE — SUBJECTIVE & OBJECTIVE
Hospital course: Admit to labor and delivery  EFM/TOCO  SVE  Pitocin per protocol  1/25/18--PPD 1, normal PP course, d/c tomorrow  1/26/18 PP day 2 discharge home    Interval History:     She is doing well this morning. She is tolerating a regular diet without nausea or vomiting. She is voiding spontaneously. She is ambulating. She has passed flatus, and has a BM. Vaginal bleeding is mild. She denies fever or chills. Abdominal pain is mild and controlled with oral medications. She is breastfeeding. She desires circumcision for her male baby: not applicable.    Objective:     Vital Signs (Most Recent):  Temp: 97.8 °F (36.6 °C) (01/26/18 0800)  Pulse: 75 (01/26/18 0800)  Resp: 18 (01/26/18 0800)  BP: 136/81 (01/26/18 0800)  SpO2: 100 % (01/24/18 2002) Vital Signs (24h Range):  Temp:  [97.8 °F (36.6 °C)-98.5 °F (36.9 °C)] 97.8 °F (36.6 °C)  Pulse:  [63-81] 75  Resp:  [18-20] 18  BP: (134-145)/(65-82) 136/81     Weight: 117 kg (258 lb)  Body mass index is 50.39 kg/m².    No intake or output data in the 24 hours ending 01/26/18 0857    Significant Labs:  Lab Results   Component Value Date    GROUPTRH O POS 01/24/2018    HEPBSAG Negative 09/13/2017    STREPBCULT No Group B Streptococcus isolated 12/11/2017       Recent Labs  Lab 01/24/18  1615   HGB 9.0*   HCT 28.7*       I have personallly reviewed all pertinent lab results from the last 24 hours.    Physical Exam:   Constitutional: She is oriented to person, place, and time. She appears well-developed and well-nourished.     Eyes: Conjunctivae are normal. Pupils are equal, round, and reactive to light.    Neck: Normal range of motion.    Cardiovascular: Normal rate and regular rhythm.     Pulmonary/Chest: Breath sounds normal.        Abdominal: Soft.     Genitourinary: Vagina normal and uterus normal.           Musculoskeletal: Normal range of motion and moves all extremeties.       Neurological: She is alert and oriented to person, place, and time.    Skin: Skin is  warm.    Psychiatric: She has a normal mood and affect. Her behavior is normal. Thought content normal.

## 2018-02-01 ENCOUNTER — TELEPHONE (OUTPATIENT)
Dept: OBSTETRICS AND GYNECOLOGY | Facility: CLINIC | Age: 23
End: 2018-02-01

## 2018-02-01 NOTE — TELEPHONE ENCOUNTER
----- Message from Gabbi Marie sent at 2/1/2018  1:51 PM CST -----  Contact: self [phone]  States that she is returning call please call back at [phone]//thank you acc

## 2018-02-01 NOTE — TELEPHONE ENCOUNTER
----- Message from Antonia Orta sent at 2/1/2018  1:47 PM CST -----  Contact: [phone]   Pt would like to consult with the nurse about getting tubes tied. Vinicius call back at [phone]. Thx_Ah

## 2018-02-09 ENCOUNTER — TELEPHONE (OUTPATIENT)
Dept: OBSTETRICS AND GYNECOLOGY | Facility: CLINIC | Age: 23
End: 2018-02-09

## 2018-02-09 NOTE — TELEPHONE ENCOUNTER
Spoke with pt, wanted to schedule BTL consult. Scheduled appointment per pt request. Pt verbalized understanding.

## 2018-02-09 NOTE — TELEPHONE ENCOUNTER
----- Message from Rod Grimaldo sent at 2/9/2018  2:39 PM CST -----  Contact: Pt  Pt request a call from the nurse to schedule to schedule the Tubulization, please contact the pt at [phone]

## 2018-02-21 ENCOUNTER — OFFICE VISIT (OUTPATIENT)
Dept: OBSTETRICS AND GYNECOLOGY | Facility: CLINIC | Age: 23
End: 2018-02-21
Payer: MEDICAID

## 2018-02-21 VITALS
BODY MASS INDEX: 44.53 KG/M2 | HEIGHT: 61 IN | DIASTOLIC BLOOD PRESSURE: 78 MMHG | WEIGHT: 235.88 LBS | SYSTOLIC BLOOD PRESSURE: 128 MMHG

## 2018-02-21 DIAGNOSIS — Z30.09 STERILIZATION CONSULT: Primary | ICD-10-CM

## 2018-02-21 PROCEDURE — 99999 PR PBB SHADOW E&M-EST. PATIENT-LVL II: CPT | Mod: PBBFAC,,, | Performed by: OBSTETRICS & GYNECOLOGY

## 2018-02-21 PROCEDURE — 99212 OFFICE O/P EST SF 10 MIN: CPT | Mod: S$PBB,,, | Performed by: OBSTETRICS & GYNECOLOGY

## 2018-02-21 PROCEDURE — 3008F BODY MASS INDEX DOCD: CPT | Mod: ,,, | Performed by: OBSTETRICS & GYNECOLOGY

## 2018-02-21 PROCEDURE — 99212 OFFICE O/P EST SF 10 MIN: CPT | Mod: PBBFAC,TH | Performed by: OBSTETRICS & GYNECOLOGY

## 2018-02-21 RX ORDER — CLINDAMYCIN HYDROCHLORIDE 300 MG/1
CAPSULE ORAL
COMMUNITY
Start: 2018-02-15 | End: 2018-03-20

## 2018-02-21 NOTE — PROGRESS NOTES
Subjective:       Patient ID: Sybil Bal is a 22 y.o. female.    Chief Complaint:  Follow-up (btl consult)      History of Present Illness  HPI  Pt is s/p a recent uncomplicated .  Pt is here to discuss sterilization.  Pt has 4 children in past 5 years and has no desire for any future pregnancy.  Pt desires permanent sterilization.  Pt is in a mutually exclusive relationship.  Pt denies blues/depression and is doing well with baby.    GYN & OB History  Patient's last menstrual period was 2018 (exact date).   Date of Last Pap: 2016    OB History    Para Term  AB Living   4 4 4     4   SAB TAB Ectopic Multiple Live Births         0 4      # Outcome Date GA Lbr Rene/2nd Weight Sex Delivery Anes PTL Lv   4 Term 18 41w1d  4.18 kg (9 lb 3.4 oz) F Vag-Spont EPI N JAVI   3 Term 16 37w6d / 00:03 3.195 kg (7 lb 0.7 oz) M Vag-Spont EPI N JAVI   2 Term 10/03/14   3.09 kg (6 lb 13 oz)  Vag-Spont   JAVI   1 Term 12   3.544 kg (7 lb 13 oz) F Vag-Spont   JAVI          Review of Systems  Review of Systems   Constitutional: Negative for activity change, appetite change, fatigue and unexpected weight change.   Respiratory: Negative for shortness of breath.    Cardiovascular: Negative for chest pain, palpitations and leg swelling.   Gastrointestinal: Negative for abdominal pain, constipation, diarrhea, nausea and vomiting.   Genitourinary: Positive for dysmenorrhea. Negative for dyspareunia, menorrhagia, menstrual problem, pelvic pain, vaginal bleeding, vaginal discharge, vaginal pain and vaginal odor.   Musculoskeletal: Negative for back pain.   Neurological: Negative for syncope and headaches.           Objective:    Physical Exam:   Constitutional: She is oriented to person, place, and time. She appears well-developed and well-nourished. No distress.                           Neurological: She is alert and oriented to person, place, and time.     Psychiatric: She has a normal mood  and affect. Her behavior is normal. Thought content normal.          Assessment:        1. Sterilization consult    2.  (normal spontaneous vaginal delivery)             Plan:      Sterilization consult  -     Pt was counseled on fertility planning options, including contraception and sterilization options.  In particular, increased risk of regret secondary to age < 26 yo was discussed.  Pt is done with her fertility and desires permanent sterilization.  Surgery details, indications, risks, benefits, and alternatives were discussed.  Medicaid consents were signed on 17 and are on file.  Will have Myrna contact pt to schedule LSC BTL.     (normal spontaneous vaginal delivery)  -    Doing well.  Keep scheduled PP visit.

## 2018-02-26 ENCOUNTER — TELEPHONE (OUTPATIENT)
Dept: OBSTETRICS AND GYNECOLOGY | Facility: CLINIC | Age: 23
End: 2018-02-26

## 2018-02-26 DIAGNOSIS — Z30.2 ENCOUNTER FOR FEMALE STERILIZATION PROCEDURE: Primary | ICD-10-CM

## 2018-03-20 ENCOUNTER — OFFICE VISIT (OUTPATIENT)
Dept: OBSTETRICS AND GYNECOLOGY | Facility: CLINIC | Age: 23
End: 2018-03-20
Payer: MEDICAID

## 2018-03-20 VITALS
SYSTOLIC BLOOD PRESSURE: 118 MMHG | DIASTOLIC BLOOD PRESSURE: 78 MMHG | BODY MASS INDEX: 45.54 KG/M2 | HEIGHT: 60 IN | WEIGHT: 231.94 LBS

## 2018-03-20 DIAGNOSIS — Z30.2 ENCOUNTER FOR FEMALE STERILIZATION PROCEDURE: Primary | ICD-10-CM

## 2018-03-20 PROCEDURE — 99999 PR PBB SHADOW E&M-EST. PATIENT-LVL III: CPT | Mod: PBBFAC,,, | Performed by: OBSTETRICS & GYNECOLOGY

## 2018-03-20 PROCEDURE — 99499 UNLISTED E&M SERVICE: CPT | Mod: S$PBB,,, | Performed by: OBSTETRICS & GYNECOLOGY

## 2018-03-20 PROCEDURE — 99213 OFFICE O/P EST LOW 20 MIN: CPT | Mod: PBBFAC | Performed by: OBSTETRICS & GYNECOLOGY

## 2018-03-20 RX ORDER — LISDEXAMFETAMINE DIMESYLATE 70 MG/1
CAPSULE ORAL
COMMUNITY
Start: 2018-02-22

## 2018-03-20 NOTE — PROGRESS NOTES
Pt is here for pre-operative visit.  Pt reports no complaints.  Ready for surgery.    ROS Negative     Physical Exam:  See H+P    A/P:  Encounter for female sterilization procedure  -     Procedure risks, benefits, and alternatives were discussed.  Pt voiced understanding and expressed consent for LSC BTL.  Hospital forms were reviewed with pt and signed.  Pre-operative instructions were given.

## 2018-03-20 NOTE — H&P
[facility]  Obstetrics & Gynecology  History & Physical    Patient Name: Sybil Bal  MRN: [id number]  Admission Date: (Not on file)  Primary Care Provider: Clary Monroe MD    Subjective:     Chief Complaint/Reason for Admission: surgery    History of Present Illness:   24 yo  who desires permanent sterilization is here for scheduled sterilization surgery.    Current Outpatient Prescriptions on File Prior to Visit   Medication Sig    hydrOXYzine pamoate (VISTARIL) 25 MG Cap take 1 capsule by mouth four times a day    PRENATAL VIT,ANNA 74/IRON/FOLIC (PRENATAL VITAMIN 1+1 ORAL) Take by mouth.    ranitidine (ZANTAC) 150 MG tablet Take 1 tablet (150 mg total) by mouth 2 (two) times daily.    ziprasidone (GEODON) 80 MG capsule Take 80 mg by mouth once daily.    [DISCONTINUED] butalbital-acetaminophen-caffeine -40 mg (FIORICET, ESGIC) -40 mg per tablet Take 2 tablets by mouth every 6 (six) hours as needed for Headaches.    [DISCONTINUED] clindamycin (CLEOCIN) 300 MG capsule     [DISCONTINUED] ferrous sulfate 325 mg (65 mg iron) Tab tablet Take 1 tablet (325 mg total) by mouth once daily.     No current facility-administered medications on file prior to visit.        Review of patient's allergies indicates:   Allergen Reactions    Morphine Hallucinations       Past Medical History:   Diagnosis Date    Anemia     Anxiety     Bipolar 1 disorder     Morbid obesity     pt weighed 400# prior to gastric sleeve surgery    Tobacco use disorder      OB History    Para Term  AB Living   4 4 4     4   SAB TAB Ectopic Multiple Live Births         0 4      # Outcome Date GA Lbr Rene/2nd Weight Sex Delivery Anes PTL Lv   4 Term 18 41w1d  4.18 kg (9 lb 3.4 oz) F Vag-Spont EPI N JAVI   3 Term 16 37w6d / 00:03 3.195 kg (7 lb 0.7 oz) M Vag-Spont EPI N JAVI   2 Term 10/03/14   3.09 kg (6 lb 13 oz)  Vag-Spont   JAVI   1 Term 12   3.544 kg (7 lb 13 oz) F Vag-Spont   JAVI         Past Surgical History:   Procedure Laterality Date    ABDOMINAL SURGERY      gastric sleeve      TONSILLECTOMY       Family History     None        Social History Main Topics    Smoking status: Current Every Day Smoker     Packs/day: 1.00     Types: Cigarettes     Last attempt to quit: 2/10/2014    Smokeless tobacco: Never Used    Alcohol use No    Drug use: No    Sexual activity: No     Review of Systems  Objective:     Vital Signs (Most Recent):  BP: 118/78 (03/20/18 1352) Vital Signs (24h Range):  [unfilled]     Weight: 105.2 kg (231 lb 14.8 oz)  Body mass index is 45.29 kg/m².  Patient's last menstrual period was 02/15/2018.    Physical Exam    Laboratory:  I have personallly reviewed all pertinent lab results from the last 24 hours.    Diagnostic Results:  Labs: Reviewed  Pap reviewed    Assessment/Plan:     There are no hospital problems to display for this patient.    Encounter for female sterilization procedure  -     Procedure risks, benefits, and alternatives were discussed.  Pt voiced understanding and expressed consent for LSC BTL.  Hospital forms were reviewed with pt and signed.  Pre-operative instructions were given.    Garett Pat MD  Obstetrics & Gynecology  [facility]

## 2018-03-22 NOTE — PRE-PROCEDURE INSTRUCTIONS
Pre op instructions reviewed with patient per phone:    To confirm, Your surgeon has instructed you:  Surgery is scheduled 3/26/18 at 0945.      Please report to [facility] 1st floor main lobby by 0815  Pre admit office will call afternoon prior to surgery for final arrival time.      INSTRUCTIONS IMPORTANT!!!  ¨ No smoking after 12 midnight, the night before surgery.  ¨ No solid food after 12 midnight, but you may have clear liquids up until 3 hours prior to surgery.  This includes: grape, cranberry, and apple juice (not orange, and no coffee.)   ¨ OK to brush teeth, but no gum, candy or mints!    ¨ Take only these medicines with a small swallow of water-morning of surgery.  None  ____  Do not wear makeup, including mascara.  ____  No powder, lotions or creams to surgical area.  ____  Please remove all jewelry, including piercings and leave at home.  ____  No money or valuables needed. Please leave at home.  ____  Please bring identification and insurance information to hospital.  ____  If going home the same day, arrange for a ride home. You will not be able to   drive if Anesthesia was used.  ____  Children, under 12 years old, must remain in the waiting room with an adult.  They are not allowed in patient areas.  ____  Wear loose fitting clothing. Allow for dressings, bandages.  ____  Stop Aspirin, Ibuprofen, Motrin and Aleve at least 5-7 days before surgery, unless otherwise instructed by your doctor, or the nurse.   You MAY use Tylenol/acetaminophen until day of surgery.  ____  If you take diabetic medication, do not take am of surgery unless instructed by   Doctor.  ____ Stop taking any Fish Oil supplement or any Vitamins that contain Vitamin E at least 5 days prior to surgery.          Bathing Instructions-- The night before surgery and the morning prior to coming to the hospital:   -Do not shave the surgical area.   -Shower and wash your hair and body as usual with your regular soap  and shampoo.   -Rinse your hair and body completely.   -Use one packet of hibiclens to wash the surgical site (using your hand) gently for 5 minutes.  Do not scrub you skin too hard.   -Do not use hibiclens on your head, face, or genitals.   -Do not wash with regular soap after you use the hibiclens.   -Rinse your body thoroughly.   -Dry with clean, soft towel.  Do not use lotion, cream, deodorant, or powders on   the surgical site.    Use antibacterial soap in place of hibiclens if your surgery is on the head, face or genitals.         Surgical Site Infection    Prevention of surgical site infections:     -Keep incisions clean and dry.   -Do not soak/submerge incisions in water until completely healed.   -Do not apply lotions, powders, creams, or deodorants to site.   -Always make sure hands are cleaned with antibacterial soap/ alcohol-based   prior to touching the surgical site.  (This includes doctors, nurses, staff, and yourself.)    Signs and symptoms:   -Redness and pain around the area where you had surgery   -Drainage of cloudy fluid from your surgical wound   -Fever over 100.4  I have read or had read and explained to me, and understand the above information.

## 2018-03-26 ENCOUNTER — HOSPITAL ENCOUNTER (OUTPATIENT)
Facility: HOSPITAL | Age: 23
Discharge: HOME | End: 2018-03-26
Attending: OBSTETRICS & GYNECOLOGY | Admitting: OBSTETRICS & GYNECOLOGY
Payer: MEDICAID

## 2018-03-26 ENCOUNTER — NURSE TRIAGE (OUTPATIENT)
Dept: ADMINISTRATIVE | Facility: CLINIC | Age: 23
End: 2018-03-26

## 2018-03-26 ENCOUNTER — ANESTHESIA EVENT (OUTPATIENT)
Dept: SURGERY | Facility: HOSPITAL | Age: 23
End: 2018-03-26
Payer: MEDICAID

## 2018-03-26 ENCOUNTER — ANESTHESIA (OUTPATIENT)
Dept: SURGERY | Facility: HOSPITAL | Age: 23
End: 2018-03-26
Payer: MEDICAID

## 2018-03-26 ENCOUNTER — SURGERY (OUTPATIENT)
Age: 23
End: 2018-03-26

## 2018-03-26 VITALS
OXYGEN SATURATION: 98 % | DIASTOLIC BLOOD PRESSURE: 72 MMHG | TEMPERATURE: 98 F | HEIGHT: 60 IN | SYSTOLIC BLOOD PRESSURE: 142 MMHG | RESPIRATION RATE: 15 BRPM | WEIGHT: 233 LBS | HEART RATE: 61 BPM | BODY MASS INDEX: 45.75 KG/M2

## 2018-03-26 DIAGNOSIS — Z30.2 ENCOUNTER FOR FEMALE STERILIZATION PROCEDURE: ICD-10-CM

## 2018-03-26 DIAGNOSIS — Z98.51 STATUS POST TUBAL LIGATION: Primary | ICD-10-CM

## 2018-03-26 LAB
B-HCG UR QL: NEGATIVE
CTP QC/QA: YES

## 2018-03-26 PROCEDURE — 81025 URINE PREGNANCY TEST: CPT | Performed by: OBSTETRICS & GYNECOLOGY

## 2018-03-26 PROCEDURE — 63600175 PHARM REV CODE 636 W HCPCS: Performed by: ANESTHESIOLOGY

## 2018-03-26 PROCEDURE — 71000015 HC POSTOP RECOV 1ST HR: Performed by: OBSTETRICS & GYNECOLOGY

## 2018-03-26 PROCEDURE — 58671 LAPAROSCOPY TUBAL BLOCK: CPT | Mod: ,,, | Performed by: OBSTETRICS & GYNECOLOGY

## 2018-03-26 PROCEDURE — 36000709 HC OR TIME LEV III EA ADD 15 MIN: Performed by: OBSTETRICS & GYNECOLOGY

## 2018-03-26 PROCEDURE — 00851 ANES IPER PX TUBAL LIGATION: CPT | Performed by: OBSTETRICS & GYNECOLOGY

## 2018-03-26 PROCEDURE — 63600175 PHARM REV CODE 636 W HCPCS: Performed by: NURSE ANESTHETIST, CERTIFIED REGISTERED

## 2018-03-26 PROCEDURE — 37000008 HC ANESTHESIA 1ST 15 MINUTES: Performed by: OBSTETRICS & GYNECOLOGY

## 2018-03-26 PROCEDURE — 27800903 OPTIME MED/SURG SUP & DEVICES OTHER IMPLANTS: Performed by: OBSTETRICS & GYNECOLOGY

## 2018-03-26 PROCEDURE — 25000003 PHARM REV CODE 250: Performed by: NURSE ANESTHETIST, CERTIFIED REGISTERED

## 2018-03-26 PROCEDURE — 71000039 HC RECOVERY, EACH ADD'L HOUR: Performed by: OBSTETRICS & GYNECOLOGY

## 2018-03-26 PROCEDURE — 71000033 HC RECOVERY, INTIAL HOUR: Performed by: OBSTETRICS & GYNECOLOGY

## 2018-03-26 PROCEDURE — 36000708 HC OR TIME LEV III 1ST 15 MIN: Performed by: OBSTETRICS & GYNECOLOGY

## 2018-03-26 PROCEDURE — 37000009 HC ANESTHESIA EA ADD 15 MINS: Performed by: OBSTETRICS & GYNECOLOGY

## 2018-03-26 PROCEDURE — 63600175 PHARM REV CODE 636 W HCPCS: Performed by: OBSTETRICS & GYNECOLOGY

## 2018-03-26 PROCEDURE — 25000003 PHARM REV CODE 250: Performed by: OBSTETRICS & GYNECOLOGY

## 2018-03-26 DEVICE — RING FALOPIAN RING: Type: IMPLANTABLE DEVICE | Site: FALLOPIAN TUBE | Status: FUNCTIONAL

## 2018-03-26 RX ORDER — LIDOCAINE HYDROCHLORIDE 20 MG/ML
JELLY TOPICAL
Status: DISCONTINUED | OUTPATIENT
Start: 2018-03-26 | End: 2018-03-26

## 2018-03-26 RX ORDER — ACETAMINOPHEN 10 MG/ML
1000 INJECTION, SOLUTION INTRAVENOUS EVERY 8 HOURS
Status: DISCONTINUED | OUTPATIENT
Start: 2018-03-26 | End: 2018-03-26

## 2018-03-26 RX ORDER — METOCLOPRAMIDE HYDROCHLORIDE 5 MG/ML
10 INJECTION INTRAMUSCULAR; INTRAVENOUS EVERY 10 MIN PRN
Status: DISCONTINUED | OUTPATIENT
Start: 2018-03-26 | End: 2018-03-26 | Stop reason: HOSPADM

## 2018-03-26 RX ORDER — MEPERIDINE HYDROCHLORIDE 50 MG/ML
50 INJECTION INTRAMUSCULAR; INTRAVENOUS; SUBCUTANEOUS EVERY 4 HOURS PRN
Status: DISCONTINUED | OUTPATIENT
Start: 2018-03-26 | End: 2018-03-26 | Stop reason: HOSPADM

## 2018-03-26 RX ORDER — KETOROLAC TROMETHAMINE 30 MG/ML
30 INJECTION, SOLUTION INTRAMUSCULAR; INTRAVENOUS ONCE
Status: COMPLETED | OUTPATIENT
Start: 2018-03-26 | End: 2018-03-26

## 2018-03-26 RX ORDER — HYDRALAZINE HYDROCHLORIDE 20 MG/ML
INJECTION INTRAMUSCULAR; INTRAVENOUS
Status: DISCONTINUED | OUTPATIENT
Start: 2018-03-26 | End: 2018-03-26

## 2018-03-26 RX ORDER — ACETAMINOPHEN 10 MG/ML
1000 INJECTION, SOLUTION INTRAVENOUS ONCE
Status: COMPLETED | OUTPATIENT
Start: 2018-03-26 | End: 2018-03-26

## 2018-03-26 RX ORDER — FENTANYL CITRATE 50 UG/ML
INJECTION, SOLUTION INTRAMUSCULAR; INTRAVENOUS
Status: DISCONTINUED | OUTPATIENT
Start: 2018-03-26 | End: 2018-03-26

## 2018-03-26 RX ORDER — DIPHENHYDRAMINE HYDROCHLORIDE 50 MG/ML
25 INJECTION INTRAMUSCULAR; INTRAVENOUS EVERY 4 HOURS PRN
Status: CANCELLED | OUTPATIENT
Start: 2018-03-26

## 2018-03-26 RX ORDER — LIDOCAINE HYDROCHLORIDE 10 MG/ML
INJECTION INFILTRATION; PERINEURAL
Status: DISCONTINUED | OUTPATIENT
Start: 2018-03-26 | End: 2018-03-26

## 2018-03-26 RX ORDER — HYDROMORPHONE HYDROCHLORIDE 1 MG/ML
0.2 INJECTION, SOLUTION INTRAMUSCULAR; INTRAVENOUS; SUBCUTANEOUS EVERY 5 MIN PRN
Status: DISCONTINUED | OUTPATIENT
Start: 2018-03-26 | End: 2018-03-26 | Stop reason: HOSPADM

## 2018-03-26 RX ORDER — HYDROCODONE BITARTRATE AND ACETAMINOPHEN 5; 325 MG/1; MG/1
1 TABLET ORAL EVERY 4 HOURS PRN
Qty: 15 TABLET | Refills: 0 | Status: SHIPPED | OUTPATIENT
Start: 2018-03-26

## 2018-03-26 RX ORDER — GLYCOPYRROLATE 0.2 MG/ML
INJECTION INTRAMUSCULAR; INTRAVENOUS
Status: DISCONTINUED | OUTPATIENT
Start: 2018-03-26 | End: 2018-03-26

## 2018-03-26 RX ORDER — OXYCODONE HYDROCHLORIDE 5 MG/1
5 TABLET ORAL
Status: DISCONTINUED | OUTPATIENT
Start: 2018-03-26 | End: 2018-03-26 | Stop reason: HOSPADM

## 2018-03-26 RX ORDER — SUCCINYLCHOLINE CHLORIDE 20 MG/ML
INJECTION INTRAMUSCULAR; INTRAVENOUS
Status: DISCONTINUED | OUTPATIENT
Start: 2018-03-26 | End: 2018-03-26

## 2018-03-26 RX ORDER — SODIUM CHLORIDE, SODIUM LACTATE, POTASSIUM CHLORIDE, CALCIUM CHLORIDE 600; 310; 30; 20 MG/100ML; MG/100ML; MG/100ML; MG/100ML
INJECTION, SOLUTION INTRAVENOUS CONTINUOUS PRN
Status: DISCONTINUED | OUTPATIENT
Start: 2018-03-26 | End: 2018-03-26

## 2018-03-26 RX ORDER — ROCURONIUM BROMIDE 10 MG/ML
INJECTION, SOLUTION INTRAVENOUS
Status: DISCONTINUED | OUTPATIENT
Start: 2018-03-26 | End: 2018-03-26

## 2018-03-26 RX ORDER — DIPHENHYDRAMINE HCL 25 MG
25 CAPSULE ORAL EVERY 4 HOURS PRN
Status: CANCELLED | OUTPATIENT
Start: 2018-03-26

## 2018-03-26 RX ORDER — HYDROCODONE BITARTRATE AND ACETAMINOPHEN 10; 325 MG/1; MG/1
1 TABLET ORAL EVERY 4 HOURS PRN
Status: CANCELLED | OUTPATIENT
Start: 2018-03-26

## 2018-03-26 RX ORDER — MEPERIDINE HYDROCHLORIDE 50 MG/ML
12.5 INJECTION INTRAMUSCULAR; INTRAVENOUS; SUBCUTANEOUS ONCE AS NEEDED
Status: COMPLETED | OUTPATIENT
Start: 2018-03-26 | End: 2018-03-26

## 2018-03-26 RX ORDER — NEOSTIGMINE METHYLSULFATE 1 MG/ML
INJECTION, SOLUTION INTRAVENOUS
Status: DISCONTINUED | OUTPATIENT
Start: 2018-03-26 | End: 2018-03-26

## 2018-03-26 RX ORDER — ONDANSETRON 2 MG/ML
INJECTION INTRAMUSCULAR; INTRAVENOUS
Status: DISCONTINUED | OUTPATIENT
Start: 2018-03-26 | End: 2018-03-26

## 2018-03-26 RX ORDER — HYDROCODONE BITARTRATE AND ACETAMINOPHEN 5; 325 MG/1; MG/1
1 TABLET ORAL EVERY 4 HOURS PRN
Status: CANCELLED | OUTPATIENT
Start: 2018-03-26

## 2018-03-26 RX ORDER — PROPOFOL 10 MG/ML
VIAL (ML) INTRAVENOUS
Status: DISCONTINUED | OUTPATIENT
Start: 2018-03-26 | End: 2018-03-26

## 2018-03-26 RX ORDER — SODIUM CHLORIDE 0.9 % (FLUSH) 0.9 %
3 SYRINGE (ML) INJECTION EVERY 8 HOURS
Status: DISCONTINUED | OUTPATIENT
Start: 2018-03-26 | End: 2018-03-26 | Stop reason: HOSPADM

## 2018-03-26 RX ORDER — ONDANSETRON 8 MG/1
8 TABLET, ORALLY DISINTEGRATING ORAL EVERY 8 HOURS PRN
Status: DISCONTINUED | OUTPATIENT
Start: 2018-03-26 | End: 2018-03-26 | Stop reason: HOSPADM

## 2018-03-26 RX ORDER — SODIUM CHLORIDE 0.9 % (FLUSH) 0.9 %
3 SYRINGE (ML) INJECTION
Status: DISCONTINUED | OUTPATIENT
Start: 2018-03-26 | End: 2018-03-26 | Stop reason: HOSPADM

## 2018-03-26 RX ORDER — MIDAZOLAM HYDROCHLORIDE 1 MG/ML
INJECTION, SOLUTION INTRAMUSCULAR; INTRAVENOUS
Status: DISCONTINUED | OUTPATIENT
Start: 2018-03-26 | End: 2018-03-26

## 2018-03-26 RX ADMIN — NEOSTIGMINE METHYLSULFATE 3 MG: 1 INJECTION INTRAVENOUS at 10:03

## 2018-03-26 RX ADMIN — SODIUM CHLORIDE, SODIUM LACTATE, POTASSIUM CHLORIDE, AND CALCIUM CHLORIDE: 600; 310; 30; 20 INJECTION, SOLUTION INTRAVENOUS at 09:03

## 2018-03-26 RX ADMIN — ROCURONIUM BROMIDE 20 MG: 10 INJECTION, SOLUTION INTRAVENOUS at 09:03

## 2018-03-26 RX ADMIN — PROMETHAZINE HYDROCHLORIDE 12.5 MG: 25 INJECTION INTRAMUSCULAR; INTRAVENOUS at 11:03

## 2018-03-26 RX ADMIN — ONDANSETRON 4 MG: 2 INJECTION, SOLUTION INTRAMUSCULAR; INTRAVENOUS at 10:03

## 2018-03-26 RX ADMIN — HYDRALAZINE HYDROCHLORIDE 10 MG: 20 INJECTION INTRAMUSCULAR; INTRAVENOUS at 10:03

## 2018-03-26 RX ADMIN — PROPOFOL 140 MG: 10 INJECTION, EMULSION INTRAVENOUS at 09:03

## 2018-03-26 RX ADMIN — SUCCINYLCHOLINE CHLORIDE 120 MG: 20 INJECTION, SOLUTION INTRAMUSCULAR; INTRAVENOUS at 09:03

## 2018-03-26 RX ADMIN — Medication 0.2 MG: at 11:03

## 2018-03-26 RX ADMIN — Medication 0.2 MG: at 10:03

## 2018-03-26 RX ADMIN — FENTANYL CITRATE 100 MCG: 50 INJECTION, SOLUTION INTRAMUSCULAR; INTRAVENOUS at 09:03

## 2018-03-26 RX ADMIN — ROBINUL 0.4 MG: 0.2 INJECTION INTRAMUSCULAR; INTRAVENOUS at 10:03

## 2018-03-26 RX ADMIN — ROBINUL 0.2 MG: 0.2 INJECTION INTRAMUSCULAR; INTRAVENOUS at 09:03

## 2018-03-26 RX ADMIN — MIDAZOLAM 2 MG: 1 INJECTION INTRAMUSCULAR; INTRAVENOUS at 09:03

## 2018-03-26 RX ADMIN — ACETAMINOPHEN 1000 MG: 10 INJECTION, SOLUTION INTRAVENOUS at 11:03

## 2018-03-26 RX ADMIN — LIDOCAINE HYDROCHLORIDE 2 ML: 20 JELLY TOPICAL at 09:03

## 2018-03-26 RX ADMIN — LIDOCAINE HYDROCHLORIDE 60 MG: 10 INJECTION, SOLUTION INFILTRATION; PERINEURAL at 09:03

## 2018-03-26 RX ADMIN — ROCURONIUM BROMIDE 5 MG: 10 INJECTION, SOLUTION INTRAVENOUS at 09:03

## 2018-03-26 RX ADMIN — MEPERIDINE HYDROCHLORIDE 12.5 MG: 50 INJECTION INTRAMUSCULAR; INTRAVENOUS; SUBCUTANEOUS at 10:03

## 2018-03-26 RX ADMIN — KETOROLAC TROMETHAMINE 30 MG: 30 INJECTION, SOLUTION INTRAMUSCULAR; INTRAVENOUS at 10:03

## 2018-03-26 NOTE — H&P (VIEW-ONLY)
[facility]  Obstetrics & Gynecology  History & Physical    Patient Name: Sybil Bal  MRN: [id number]  Admission Date: (Not on file)  Primary Care Provider: Clary Monroe MD    Subjective:     Chief Complaint/Reason for Admission: surgery    History of Present Illness:   24 yo  who desires permanent sterilization is here for scheduled sterilization surgery.    Current Outpatient Prescriptions on File Prior to Visit   Medication Sig    hydrOXYzine pamoate (VISTARIL) 25 MG Cap take 1 capsule by mouth four times a day    PRENATAL VIT,ANNA 74/IRON/FOLIC (PRENATAL VITAMIN 1+1 ORAL) Take by mouth.    ranitidine (ZANTAC) 150 MG tablet Take 1 tablet (150 mg total) by mouth 2 (two) times daily.    ziprasidone (GEODON) 80 MG capsule Take 80 mg by mouth once daily.    [DISCONTINUED] butalbital-acetaminophen-caffeine -40 mg (FIORICET, ESGIC) -40 mg per tablet Take 2 tablets by mouth every 6 (six) hours as needed for Headaches.    [DISCONTINUED] clindamycin (CLEOCIN) 300 MG capsule     [DISCONTINUED] ferrous sulfate 325 mg (65 mg iron) Tab tablet Take 1 tablet (325 mg total) by mouth once daily.     No current facility-administered medications on file prior to visit.        Review of patient's allergies indicates:   Allergen Reactions    Morphine Hallucinations       Past Medical History:   Diagnosis Date    Anemia     Anxiety     Bipolar 1 disorder     Morbid obesity     pt weighed 400# prior to gastric sleeve surgery    Tobacco use disorder      OB History    Para Term  AB Living   4 4 4     4   SAB TAB Ectopic Multiple Live Births         0 4      # Outcome Date GA Lbr Rene/2nd Weight Sex Delivery Anes PTL Lv   4 Term 18 41w1d  4.18 kg (9 lb 3.4 oz) F Vag-Spont EPI N JAVI   3 Term 16 37w6d / 00:03 3.195 kg (7 lb 0.7 oz) M Vag-Spont EPI N JAVI   2 Term 10/03/14   3.09 kg (6 lb 13 oz)  Vag-Spont   JAVI   1 Term 12   3.544 kg (7 lb 13 oz) F Vag-Spont   JAVI         Past Surgical History:   Procedure Laterality Date    ABDOMINAL SURGERY      gastric sleeve      TONSILLECTOMY       Family History     None        Social History Main Topics    Smoking status: Current Every Day Smoker     Packs/day: 1.00     Types: Cigarettes     Last attempt to quit: 2/10/2014    Smokeless tobacco: Never Used    Alcohol use No    Drug use: No    Sexual activity: No     Review of Systems  Objective:     Vital Signs (Most Recent):  BP: 118/78 (03/20/18 1352) Vital Signs (24h Range):  [unfilled]     Weight: 105.2 kg (231 lb 14.8 oz)  Body mass index is 45.29 kg/m².  Patient's last menstrual period was 02/15/2018.    Physical Exam    Laboratory:  I have personallly reviewed all pertinent lab results from the last 24 hours.    Diagnostic Results:  Labs: Reviewed  Pap reviewed    Assessment/Plan:     There are no hospital problems to display for this patient.    Encounter for female sterilization procedure  -     Procedure risks, benefits, and alternatives were discussed.  Pt voiced understanding and expressed consent for LSC BTL.  Hospital forms were reviewed with pt and signed.  Pre-operative instructions were given.    Garett Pat MD  Obstetrics & Gynecology  [facility]

## 2018-03-26 NOTE — TELEPHONE ENCOUNTER
Reason for Disposition   Information only question and nurse able to answer    Protocols used: ST NO PROTOCOL CALL: INFORMATION ONLY-A-OH    Pt had some questions about her procedure today. Wanted to know if vaginal bleeding was expected. Went through discharge instructions with patient.

## 2018-03-26 NOTE — OP NOTE
OPERATIVE NOTE    DATE:  03/26/2018    PRE-PROCEDURE COUNSELING:  Patient counseled on the risks, benefits, and alternatives to procedure.  Please see preoperative consents.   SCDs were applied and working prior to anesthesia induction.    PRE-PROCEDURE DIAGNOSIS:  Desires sterilization    POST-PROCEDURE DIAGNOSIS: Same    ANESTHESIA:  General Endotracheal Anesthesia    PROCEDURE   Laparoscopic Bilateral Tubal Ligation (Falope Ring)     SURGEON:  Garett Pat MD    ASSISTANT: LAURA Rm    FINDINGS: Small uterus with moderate descent, normal appearing tubes and ovaries    SPECIMEN:  None     EBL: 5 mL     COMPLICATIONS: None    IMPLANTS:  None    PROCEDURE IN DETAIL:  TIME OUT PERFORMED  SCDs were on prior to anesthesia induction.  Patient was placed in dorsal lithotomy position, then prepped and draped in routine fashion. A sterile speculum was placed in the vagina. Anterior lip of the cervix was grasped with a single tooth tenaculum and a ZUMI manipultor was placed without difficulty.  Instruments were then removed from the vagina and the bladder drained with a disposable catheter.    Attention was then turned to the abdomen where the anterior abdominal wall was grasped with towel clamps and elevated.  A Veress needle was gently introduced through the umbilicus and water drop test performed to confirm intraabdominal placement.  The abdomen was insufflated with CO2 gas to an intraabdominal pressure of 15 mmHg.  Veress was then removed and a 5 mm  incision was made in the umbilicus.  A 5 mm trocar was placed into the abdomen and general anatomic survey was performed revealing the above findings. Under direct visualization, an ancillary 8 mm suprapubic port were placed.  The Falope ring applicator was used to place a ring on each fallopian tube about 2-3cm from the uterine cornu, incorporating at least 1-2 cm of tube. This was done on each side with no problems. Pictures were taken and added to the  chart.  Skin wounds were approximated with 4-0 Monocryl and an adhesive glue layer was added for reinforcement.  Lap, sharp, and instrument counts were correct x 2.      Patient was sent to the recovery room in stable condition    CONDITION: stable    DISPOSITION: recovery room

## 2018-03-26 NOTE — ANESTHESIA PREPROCEDURE EVALUATION
03/26/2018  Sybil Bal is a 23 y.o., female.    Anesthesia Evaluation    I have reviewed the Patient Summary Reports.    I have reviewed the Nursing Notes.   I have reviewed the Medications.     Review of Systems  Anesthesia Hx:  No problems with previous Anesthesia  Neg history of prior surgery. Denies Family Hx of Anesthesia complications.   Denies Personal Hx of Anesthesia complications.   Social:  Smoker, No Alcohol Use    Hematology/Oncology:     Oncology Normal    -- Anemia:   EENT/Dental:EENT/Dental Normal   Cardiovascular:   Exercise tolerance: poor    Pulmonary:  Pulmonary Normal    Renal/:  Renal/ Normal     Hepatic/GI:  Hepatic/GI Normal    Musculoskeletal:  Musculoskeletal Normal    Neurological:  Neurology Normal    Endocrine:  Endocrine Normal    Dermatological:  Skin Normal    Psych:   Psychiatric History          Physical Exam  General:  Morbid Obesity    Airway/Jaw/Neck:  Airway Findings: Mouth Opening: Normal Mallampati: II        Eyes/Ears/Nose:  EYES/EARS/NOSE FINDINGS: Normal   Dental:  Dental Findings: In tact   Chest/Lungs:  Chest/Lungs Findings: Clear to auscultation, Normal Respiratory Rate     Heart/Vascular:  Heart Findings: Rate: Normal  Rhythm: Regular Rhythm  Sounds: Normal  Heart murmur: negative Vascular Findings: Normal    Abdomen:  Abdomen Findings:  Nontender     Musculoskeletal:  Musculoskeletal Findings: Normal   Skin:  Skin Findings: Normal    Mental Status:  Mental Status Findings:  Alert and Oriented, Cooperative         Anesthesia Plan  Type of Anesthesia, risks & benefits discussed:  Anesthesia Type:  general  Patient's Preference:   Intra-op Monitoring Plan:   Intra-op Monitoring Plan Comments:   Post Op Pain Control Plan:   Post Op Pain Control Plan Comments:   Induction:   IV  Beta Blocker:  Patient is not currently on a Beta-Blocker (No further  documentation required).       Informed Consent: Patient understands risks and agrees with Anesthesia plan.  Questions answered. Anesthesia consent signed with patient.  ASA Score: 2     Day of Surgery Review of History & Physical: I have interviewed and examined the patient. I have reviewed the patient's H&P dated:   Significant changes noted: Surgeon notified.          Ready For Surgery From Anesthesia Perspective.

## 2018-03-26 NOTE — ANESTHESIA POSTPROCEDURE EVALUATION
Anesthesia Post Evaluation    Patient: Sybil Bal    Procedure(s) Performed: Procedure(s) (LRB):  [id number] (Bilateral)    Final Anesthesia Type: general  Patient location during evaluation: PACU  Patient participation: Yes- Able to Participate  Level of consciousness: awake and alert  Post-procedure vital signs: reviewed and stable  Pain management: adequate  Airway patency: patent  PONV status at discharge: No PONV  Anesthetic complications: no      Cardiovascular status: blood pressure returned to baseline  Respiratory status: unassisted  Hydration status: euvolemic  Follow-up not needed.        Visit Vitals  BP (!) 142/72   Pulse 61   Temp 36.7 °C (98 °F) (Temporal)   Resp 15   Ht 5' (1.524 m)   Wt 105.7 kg (233 lb 0.4 oz)   SpO2 98%   Breastfeeding? No   BMI 45.51 kg/m²       Pain/Loida Score: Presence of Pain: complains of pain/discomfort (3/26/2018 12:00 PM)  Pain Rating Prior to Med Admin: 6 (3/26/2018 11:35 AM)  Loida Score: 10 (3/26/2018 12:00 PM)

## 2018-03-26 NOTE — TRANSFER OF CARE
Anesthesia Transfer of Care Note    Patient: Sybil Bal    Procedure(s) Performed: Procedure(s) (LRB):  [id number] (Bilateral)    Patient location: PACU    Anesthesia Type: general    Transport from OR: Transported from OR on room air with adequate spontaneous ventilation    Post pain: adequate analgesia    Post assessment: no apparent anesthetic complications    Post vital signs: stable    Level of consciousness: awake    Nausea/Vomiting: no nausea/vomiting    Complications: none    Transfer of care protocol was followed      Last vitals:   Visit Vitals  BP (!) 161/82 (BP Location: Right arm, Patient Position: Sitting)   Pulse 60   Temp 36.5 °C (97.7 °F) (Temporal)   Resp 15   Ht 5' (1.524 m)   Wt 105.7 kg (233 lb 0.4 oz)   SpO2 100%   Breastfeeding? No   BMI 45.51 kg/m²      Mucosal Advancement Flap Text: Given the location of the defect, shape of the defect and the proximity to free margins a mucosal advancement flap was deemed most appropriate. Incisions were made with a 15 blade scalpel in the appropriate fashion along the cutaneous vermilion border and the mucosal lip. The remaining actinically damaged mucosal tissue was excised.  The mucosal advancement flap was then elevated to the gingival sulcus with care taken to preserve the neurovascular structures and advanced into the primary defect. Care was taken to ensure that precise realignment of the vermilion border was achieved.

## 2018-03-26 NOTE — DISCHARGE SUMMARY
Discharge Note  Short Stay      SUMMARY     Admit Date: 3/26/2018    Attending Physician: Garett Pat MD     Discharge Physician: Garett Pat MD    Discharge Date: 3/26/2018 10:24 AM    Final Diagnosis:   1. Status post tubal ligation    2. Encounter for female sterilization procedure        Disposition: Home or Self Care    Condition:  Stable    Hospital Course:  Pt was admitted for scheduled sterilization surgery.  LSC BTL was performed without complications.  Post-operative course was unremarkable and pt recovered well.  Pt was discharged upon meeting all discharge criteria.  Pt was counseled on post-operative care and warning sign instructions prior to her discharge.    Patient Instructions:   Current Discharge Medication List      START taking these medications    Details   hydrocodone-acetaminophen 5-325mg (NORCO) 5-325 mg per tablet Take 1 tablet by mouth every 4 (four) hours as needed for Pain.  Qty: 15 tablet, Refills: 0    Associated Diagnoses: Status post tubal ligation         CONTINUE these medications which have NOT CHANGED    Details   hydrOXYzine pamoate (VISTARIL) 25 MG Cap take 1 capsule by mouth four times a day  Qty: 30 capsule, Refills: 2      PRENATAL VIT,ANNA 74/IRON/FOLIC (PRENATAL VITAMIN 1+1 ORAL) Take by mouth.      VYVANSE 70 mg capsule       ziprasidone (GEODON) 80 MG capsule Take 80 mg by mouth every evening.       ranitidine (ZANTAC) 150 MG tablet Take 1 tablet (150 mg total) by mouth 2 (two) times daily.  Qty: 60 tablet, Refills: 3             Discharge Procedure Orders (must include Diet, Follow-up, Activity)    Discharge Procedure Orders (must include Diet, Follow-up, Activity)  Diet general     Other restrictions (specify):   Order Comments: Light activity x 1 week and pelvic rest x 2 weeks     Call MD for:  temperature >100.4     Call MD for:  persistent nausea and vomiting     Call MD for:  severe uncontrolled pain     Call MD for:  difficulty breathing, headache or visual  disturbances     Call MD for:  redness, tenderness, or signs of infection (pain, swelling, redness, odor or green/yellow discharge around incision site)     Call MD for:  hives     Call MD for:  persistent dizziness or light-headedness     Call MD for:  extreme fatigue     No dressing needed        Follow up with Dr. Pat in 4 weeks

## 2018-03-26 NOTE — DISCHARGE INSTRUCTIONS
General Information:    1. Do not drink alcoholic beverages including beer for 24 hours or as long as you are on pain medication..  2. Do not drive a motor vehicle, operate machinery or power tools, or signs legal papers for 24 hours or as long as you are on pain medication.   3. You may experience light-headedness, dizziness, and sleepiness following surgery. Please do not stay alone. A responsible adult should be with you for this 24 hour period.  4. Go home and rest.  5. Progress slowly to a normal diet unless instructed.  Otherwise, begin with liquids such as soft drinks, then soup and crackers working up to solid foods. Drink plenty of nonalcoholic fluids.  6. Certain anesthetics and pain medications produce nausea and vomiting in certain individuals. If nausea becomes a problem at home, call you doctor.  7. A nurse will be calling you sometime after surgery. Do not be alarmed. This is our way of finding out how you are doing.  8. Several times every hour while you are awake, take 2-3 deep breaths and cough. If you had stomach surgery hold a pillow or rolled towel firmly against your stomach before you cough. This will help with any pain the cough might cause.  9. Several times every hour while you are awake, pump and flex your feet 5-6 times and do foot circles. This will help prevent blood clots.  10. Call your doctor for severe pain, bleeding, fever, or signs or symptoms of infection (pain, swelling, redness, foul odor, drainage).  11. You can contact your doctor anytime by [phone] for the [facility] (at [facility]) or [phone] for the [facility] on [facility].   [email or web address] is another way to contact your doctor if you are an active participant online with [facility].  12. Continue Nozin provided at discharge twice daily for 7 days or until the incision is healed.  See pamphlet or box for instructions.

## 2018-10-15 NOTE — PROGRESS NOTES
No retinal detachment or retinal tear noted. S: , admitted into observation last night for h/a with increased BP on arrival.   Observation and rest yielded a decreasing to normotensive BP at present.  Patient states headache/ left sided temporal/ is back and left side of my face feels hot and numb.   O: BP: 125/74       Cranial nerves 3-7 & 11 are intact and no evidence of palsy or weakness with testing done.        FHT' cat 1       CTX:  occ irritability.        VE : deferred.   A:  @ 38.4, normotensive       Headache  P: Consulted MD and will repeat IV analgesia x 1 and allow rest.      If any evidence of elevating BP or headache not resolved and  further facial numbness, with this last dose of analgesia,  will admit and induce.      If patient improves will allow discharge with pre-e precautions.

## 2019-04-09 ENCOUNTER — HOSPITAL ENCOUNTER (EMERGENCY)
Facility: HOSPITAL | Age: 24
Discharge: HOME | End: 2019-04-09
Attending: EMERGENCY MEDICINE
Payer: MEDICAID

## 2019-04-09 VITALS
HEIGHT: 60 IN | WEIGHT: 266.75 LBS | TEMPERATURE: 99 F | BODY MASS INDEX: 52.37 KG/M2 | HEART RATE: 63 BPM | SYSTOLIC BLOOD PRESSURE: 155 MMHG | DIASTOLIC BLOOD PRESSURE: 72 MMHG | RESPIRATION RATE: 16 BRPM | OXYGEN SATURATION: 100 %

## 2019-04-09 DIAGNOSIS — B02.9 HERPES ZOSTER WITHOUT COMPLICATION: Primary | ICD-10-CM

## 2019-04-09 DIAGNOSIS — R53.1 WEAKNESS: ICD-10-CM

## 2019-04-09 PROCEDURE — 93010 EKG 12-LEAD: ICD-10-PCS | Mod: ,,, | Performed by: INTERNAL MEDICINE

## 2019-04-09 PROCEDURE — 99284 EMERGENCY DEPT VISIT MOD MDM: CPT | Mod: 25

## 2019-04-09 PROCEDURE — 93005 ELECTROCARDIOGRAM TRACING: CPT

## 2019-04-09 PROCEDURE — 93010 ELECTROCARDIOGRAM REPORT: CPT | Mod: ,,, | Performed by: INTERNAL MEDICINE

## 2019-04-09 RX ORDER — ACYCLOVIR 800 MG/1
800 TABLET ORAL
Qty: 50 TABLET | Refills: 0 | Status: SHIPPED | OUTPATIENT
Start: 2019-04-09 | End: 2019-04-19

## 2019-04-09 RX ORDER — DICLOFENAC SODIUM 50 MG/1
50 TABLET, DELAYED RELEASE ORAL 2 TIMES DAILY
Qty: 20 TABLET | Refills: 0 | Status: SHIPPED | OUTPATIENT
Start: 2019-04-09

## 2019-04-09 NOTE — ED PROVIDER NOTES
"SCRIBE #1 NOTE: I, Corinne Mack, am scribing for, and in the presence of, RACHAEL Strickland. I have scribed the entire note.      History      Chief Complaint   Patient presents with    Migraine     seen at urgent care PTA in ED, small blisters to left upper arm; feelings of "tingling" in face and left arm and throbbing migraine       Review of patient's allergies indicates:   Allergen Reactions    Morphine Hallucinations        HPI   HPI    4/9/2019, 3:52 PM   History obtained from the patient      History of Present Illness: Sybil Bal is a 24 y.o. female patient with PMHx of anemia, anxiety, and bipolar disorder who presents to the Emergency Department for a throbbing HA which onset gradually yesterday. Pt was seen at a walk-in clinic and was referred to the ED for further evaluation. Pt reports her ex-fiance had shingles 2 weeks ago and that the walk-in clinic feels the pt had shingles. Symptoms are constant and moderate in severity. Pt also c/o rash to the L upper arm which onset today. No mitigating or exacerbating factors reported. Associated sxs include jaw pain, bilateral eye pain, and fatigue. Patient denies any fever, chills, photophobia, congestion, rhinorrhea, CP, SOB, N/V/D, abd pain, back pain, neck pain, dizziness, numbness, and all other sxs at this time. No prior Tx reported. No further complaints or concerns at this time.         Arrival mode: Personal vehicle    PCP: [facility]       Past Medical History:  Past Medical History:   Diagnosis Date    Anemia     Anxiety     Bipolar 1 disorder     Morbid obesity     pt weighed 400# prior to gastric sleeve surgery    Tobacco use disorder        Past Surgical History:  Past Surgical History:   Procedure Laterality Date    ABDOMINAL SURGERY      gastric sleeve      [id number] Bilateral 3/26/2018    Performed by G[facility] Pat [facility] at [facility]    TONSILLECTOMY           Family History:  Family History "   Problem Relation Age of Onset    Diabetes Neg Hx     Hypertension Neg Hx        Social History:  Social History     Tobacco Use    Smoking status: Current Some Day Smoker     Packs/day: 1.00     Types: Cigarettes     Last attempt to quit: 2/10/2014     Years since quittin.1    Smokeless tobacco: Never Used   Substance and Sexual Activity    Alcohol use: No    Drug use: No    Sexual activity: Never     Partners: Male     Birth control/protection: None       ROS   Review of Systems   Constitutional: Positive for fatigue. Negative for chills and fever.   HENT: Negative for congestion, rhinorrhea and sore throat.         (+) jaw pain   Eyes: Positive for pain. Negative for photophobia and visual disturbance.   Respiratory: Negative for cough and shortness of breath.    Cardiovascular: Negative for chest pain and leg swelling.   Gastrointestinal: Negative for abdominal pain, diarrhea, nausea and vomiting.   Musculoskeletal: Negative for back pain, neck pain and neck stiffness.   Skin: Positive for rash (L upper arm). Negative for wound.   Neurological: Positive for headaches. Negative for dizziness, light-headedness and numbness.   All other systems reviewed and are negative.    Physical Exam      Initial Vitals [19 1519]   BP Pulse Resp Temp SpO2   (!) 155/72 63 16 98.7 °F (37.1 °C) 100 %      MAP       --          Physical Exam  Nursing Notes and Vital Signs Reviewed.  Constitutional: Patient is in no acute distress. Well-developed and well-nourished.  Head: Atraumatic. Normocephalic.  Eyes: PERRL. EOM intact. Conjunctivae are not pale. No scleral icterus.  ENT: Mucous membranes are moist. Oropharynx is clear and symmetric.    Neck: Supple. Full ROM. No lymphadenopathy.  Cardiovascular: Regular rate. Regular rhythm. No murmurs, rubs, or gallops. Distal pulses are 2+ and symmetric.  Pulmonary/Chest: No respiratory distress. Clear to auscultation bilaterally. No wheezing or rales.  Abdominal: Soft and  non-distended.  There is no tenderness.  No rebound, guarding, or rigidity.   Musculoskeletal: Moves all extremities. No obvious deformities. No edema.   Skin: Warm and dry. Fluid filled vesicular lesion to the posterior L upper arm that is TTP with 2 cm of circumferential surrounding erythema.   Neurological:  Alert, awake, and appropriate.  Normal speech.  No acute focal neurological deficits are appreciated.  Psychiatric: Normal affect. Good eye contact. Appropriate in content.    ED Course    Procedures  ED Vital Signs:  Vitals:    04/09/19 1519   BP: (!) 155/72   Pulse: 63   Resp: 16   Temp: 98.7 °F (37.1 °C)   TempSrc: Oral   SpO2: 100%   Weight: 121 kg (266 lb 12.1 oz)   Height: 5' (1.524 m)       Abnormal Lab Results:  Labs Reviewed - No data to display     All Lab Results:  None    Imaging Results:  Imaging Results    None          The EKG was ordered, reviewed, and independently interpreted by the ED provider.  Interpretation time: 1606  Rate: 59 BPM  Rhythm: sinus bradycardia and sinus arrhythmia  Interpretation: Normal QRS. No STEMI.           The Emergency Provider reviewed the vital signs and test results, which are outlined above.    ED Discussion     4:31 PM: Reassessed pt at this time. Pt is awake, alert, and in no distress. Discussed with pt all pertinent ED information and results. Discussed pt dx and plan of tx. Gave pt all f/u and return to the ED instructions. All questions and concerns were addressed at this time. Pt expresses understanding of information and instructions, and is comfortable with plan to discharge. Pt is stable for discharge.    I discussed with patient and/or family/caretaker that evaluation in the ED does not suggest any emergent or life threatening medical conditions requiring immediate intervention beyond what was provided in the ED, and I believe patient is safe for discharge.  Regardless, an unremarkable evaluation in the ED does not preclude the development or presence  of a serious of life threatening condition. As such, patient was instructed to return immediately for any worsening or change in current symptoms.    Patient's headache is either consistent with previous headache and/or lacks features concerning for emergent or life threatening condition.  I do not suspect SAH, meningitis, increased IC pressure, infectious, toxic, vascular, CNS, or other EMC.  I have discussed this at length with patient and/or family/caretaker.      ED Medication(s):  Medications - No data to display       Medication List      ASK your doctor about these medications    HYDROcodone-acetaminophen 5-325 mg per tablet  Commonly known as:  NORCO  Take 1 tablet by mouth every 4 (four) hours as needed for Pain.     hydrOXYzine pamoate 25 MG Cap  Commonly known as:  VISTARIL  take 1 capsule by mouth four times a day     PRENATAL VITAMIN 1+1 ORAL     ranitidine 150 MG tablet  Commonly known as:  ZANTAC  Take 1 tablet (150 mg total) by mouth 2 (two) times daily.     VYVANSE 70 MG capsule  Generic drug:  lisdexamfetamine     ziprasidone 80 MG capsule  Commonly known as:  GEODON                  Medical Decision Making    Medical Decision Making:   Clinical Tests:   Medical Tests: Ordered and Reviewed     Additional MDM:   Smoking Cessation: The patient is a smoker. The patient was counseled on smoking cessation for: 3 minutes. The patient was counseled on tobacco related  health complications.        Scribe Attestation:   Scribe #1: I performed the above scribed service and the documentation accurately describes the services I performed. I attest to the accuracy of the note 04/09/2019.    Attending:   Physician Attestation Statement for Scribe #1: I, RACHAEL Strickland, personally performed the services described in this documentation, as scribed by Corinne Mack, in my presence, and it is both accurate and complete.          Clinical Impression       ICD-10-CM ICD-9-CM   1. Weakness R53.1 780.79        Disposition:   Disposition: Discharged  Condition: Stable           RACHAEL Strickland  04/11/19 8847

## 2019-04-09 NOTE — ED NOTES
"Patient c/o migraine, blister to back of left arm, left side of face is swollen and "locked up".    Patient identifiers verified and correct for Sybil Bal.    LOC: The patient is awake, alert and aware of environment with an appropriate affect, the patient is oriented x 3 and speaking appropriately.  APPEARANCE: Patient resting comfortably and in no acute distress, patient is clean and well groomed, patient's clothing is properly fastened.  SKIN: Blister noted to back of left arm. Swelling noted to left side of face.  MUSCULOSKELETAL: Patient moving all extremities spontaneously.  RESPIRATORY: Airway is open and patent, respirations are spontaneous.  CARDIAC: Patient has a normal rate, no periphreal edema noted, capillary refill < 3 seconds.  ABDOMEN: Soft and non tender to palpation.      Discharge instructions explained to patient. Patient verbalizes understanding. Patient and discharge paperwork escorted to registration desk by RN at this time. Discharge paperwork given to registration for completion of discharge.     "

## 2020-09-18 ENCOUNTER — HOSPITAL ENCOUNTER (EMERGENCY)
Facility: HOSPITAL | Age: 25
Discharge: HOME | End: 2020-09-18
Attending: EMERGENCY MEDICINE
Payer: MEDICAID

## 2020-09-18 VITALS
BODY MASS INDEX: 49.99 KG/M2 | TEMPERATURE: 99 F | SYSTOLIC BLOOD PRESSURE: 174 MMHG | DIASTOLIC BLOOD PRESSURE: 97 MMHG | WEIGHT: 254.63 LBS | RESPIRATION RATE: 20 BRPM | OXYGEN SATURATION: 98 % | HEIGHT: 60 IN | HEART RATE: 69 BPM

## 2020-09-18 DIAGNOSIS — K04.7 DENTAL ABSCESS: Primary | ICD-10-CM

## 2020-09-18 PROCEDURE — 99283 EMERGENCY DEPT VISIT LOW MDM: CPT

## 2020-09-18 RX ORDER — METRONIDAZOLE 500 MG/1
500 TABLET ORAL 3 TIMES DAILY
Qty: 21 TABLET | Refills: 0 | Status: SHIPPED | OUTPATIENT
Start: 2020-09-18 | End: 2020-09-25

## 2020-09-18 NOTE — ED PROVIDER NOTES
Encounter Date: 2020       History     Chief Complaint   Patient presents with    Dental Pain     pt c/o L upper jaw dental pain, increased L-sided facial swelling; pt seen at [facility] this morning, told to return if swelling increased     25 year old female with complaint of left upper facial swelling X one day.  Also reports left upper tooth ache X 4 days.  NO fever or chills. No difficulty breathing or swelling.  Worse with eating and drinking. Pt on Amoxil X 4 days.  Prescribed Augmentin and Lortab this morning from [facility].         Review of patient's allergies indicates:   Allergen Reactions    Morphine Hallucinations    Pork/porcine containing products Other (See Comments)     Migraine     Past Medical History:   Diagnosis Date    Anemia     Anxiety     Bipolar 1 disorder     Morbid obesity     pt weighed 400# prior to gastric sleeve surgery    Tobacco use disorder      Past Surgical History:   Procedure Laterality Date    ABDOMINAL SURGERY      gastric sleeve      TONSILLECTOMY       Family History   Problem Relation Age of Onset    Diabetes Neg Hx     Hypertension Neg Hx      Social History     Tobacco Use    Smoking status: Current Some Day Smoker     Packs/day: 1.00     Types: Cigarettes     Last attempt to quit: 2/10/2014     Years since quittin.6    Smokeless tobacco: Never Used   Substance Use Topics    Alcohol use: No    Drug use: No     Review of Systems   Constitutional: Negative for fever.   HENT: Negative for sore throat.         Left upper tooth pain    Respiratory: Negative for shortness of breath.    Cardiovascular: Negative for chest pain.   Gastrointestinal: Negative for nausea.   Genitourinary: Negative for dysuria.   Musculoskeletal: Negative for back pain.   Skin: Negative for rash.   Neurological: Negative for weakness.   Hematological: Does not bruise/bleed easily.       Physical Exam     Initial Vitals [20 1326]   BP Pulse Resp Temp SpO2   (!) 174/97 69  20 98.7 °F (37.1 °C) 98 %      MAP       --         Physical Exam    Nursing note and vitals reviewed.  Constitutional: She appears well-developed and well-nourished.   HENT:   Head: Normocephalic and atraumatic.   Mild to moderate swelling left maxilla, tenderness and decay left upper lateral central incisor, no swelling around teeth, no fluctuance noted, mild swelling left upper lip but no fluctuance   Eyes: Conjunctivae and EOM are normal. Pupils are equal, round, and reactive to light.   Neck: Normal range of motion. Neck supple.   Cardiovascular: Normal rate, regular rhythm, normal heart sounds and intact distal pulses.   Pulmonary/Chest: Breath sounds normal.   Abdominal: Soft. There is no abdominal tenderness. There is no rebound and no guarding.   Musculoskeletal: Normal range of motion.   Neurological: She is alert and oriented to person, place, and time. She has normal strength and normal reflexes.   Skin: Skin is warm and dry.   Psychiatric: She has a normal mood and affect. Her behavior is normal. Thought content normal.         ED Course   Procedures  Labs Reviewed   HIV 1 / 2 ANTIBODY          Imaging Results    None                                      Clinical Impression:     ICD-10-CM ICD-9-CM   1. Dental abscess  K04.7 522.5                          ED Disposition Condition    Discharge Stable        ED Prescriptions     Medication Sig Dispense Start Date End Date Auth. Provider    metroNIDAZOLE (FLAGYL) 500 MG tablet Take 1 tablet (500 mg total) by mouth 3 (three) times daily. for 7 days 21 tablet 9/18/2020 9/25/2020 Juan Gillespie NP        Follow-up Information     Follow up With Specialties Details Why Contact Info    [facility]  Schedule an appointment as soon as possible for a visit in 2 days                                         Juan Gillespie NP  09/18/20 1341       Juan Gillespie NP  09/18/20 134

## 2022-06-24 NOTE — HOSPITAL COURSE
11/30/2017 @ 1800-patient on continuous EFM/TOCO, UA, A1C and CBC  Urine done and noted with no evidence of infection. S.G. 1.020, ketones 1+.   Po hydration done. Flexaril 10 mg po for MSK/ back spasm with relief obtained.   NST reassuring.  Cervix closed. No regular ctx noted.  Supplemental labs done as not on chart for 28 wks.    No

## (undated) DEVICE — GLOVE 8 PROTEXIS PI BLUE

## (undated) DEVICE — SUT VICRYL 4-0 ANTIBACT

## (undated) DEVICE — ADHESIVE DERMABOND ADVANCED

## (undated) DEVICE — CANNULA ENDOPATH XCEL 5X100MM

## (undated) DEVICE — CATH 16FR URETHRL RED RUB

## (undated) DEVICE — SEE MEDLINE ITEM 157181

## (undated) DEVICE — DRAPE LAVH LAPAROSCOPY W/FLUID

## (undated) DEVICE — TROCAR ENDOPATH XCEL 8MM 10CM

## (undated) DEVICE — GLOVE PROTEXIS HYDROGEL SZ7.5

## (undated) DEVICE — MANIPULATOR UTERINE

## (undated) DEVICE — SYR 3CC LUER LOC

## (undated) DEVICE — COVER OVERHEAD SURG LT BLUE

## (undated) DEVICE — KIT ANTIFOG

## (undated) DEVICE — SEE MEDLINE ITEM 154981

## (undated) DEVICE — NDL PNEUMO INSUFFLATI 120MM

## (undated) DEVICE — ELECTRODE REM PLYHSV RETURN 9

## (undated) DEVICE — APPLICATOR CHLORAPREP ORN 26ML

## (undated) DEVICE — SEE MEDLINE ITEM 152739

## (undated) DEVICE — GLOVE PROTEXIS HYDROGEL SZ7

## (undated) DEVICE — SYR 10CC LUER LOCK

## (undated) DEVICE — TUBING HEATED INSUFFLATOR

## (undated) DEVICE — MANIFOLD 4 PORT

## (undated) DEVICE — TROCAR ENDOPATH XCEL 5X100MM

## (undated) DEVICE — GOWN SURG 2XL DISP TIE BACK

## (undated) DEVICE — SEE MEDLINE ITEM 157027

## (undated) DEVICE — SEE MEDLINE ITEM 146292